# Patient Record
Sex: FEMALE | Race: WHITE | Employment: UNEMPLOYED | URBAN - NONMETROPOLITAN AREA
[De-identification: names, ages, dates, MRNs, and addresses within clinical notes are randomized per-mention and may not be internally consistent; named-entity substitution may affect disease eponyms.]

---
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
## 2019-01-23 ENCOUNTER — HOSPITAL ENCOUNTER (EMERGENCY)
Age: 19
Discharge: HOME | End: 2019-01-24
Attending: EMERGENCY MEDICINE
Payer: MEDICARE

## 2019-01-23 DIAGNOSIS — O26.899: Primary | ICD-10-CM

## 2019-01-23 DIAGNOSIS — R10.2: Primary | ICD-10-CM

## 2019-01-23 DIAGNOSIS — N89.8 VAGINAL DISCHARGE: ICD-10-CM

## 2019-01-23 PROCEDURE — 87070 CULTURE OTHR SPECIMN AEROBIC: CPT

## 2019-01-23 PROCEDURE — 87210 SMEAR WET MOUNT SALINE/INK: CPT

## 2019-01-23 PROCEDURE — 99283 EMERGENCY DEPT VISIT LOW MDM: CPT

## 2019-01-23 PROCEDURE — 2709999900 HC NON-CHARGEABLE SUPPLY

## 2019-01-23 PROCEDURE — 87491 CHLMYD TRACH DNA AMP PROBE: CPT

## 2019-01-23 PROCEDURE — 87220 TISSUE EXAM FOR FUNGI: CPT

## 2019-01-23 PROCEDURE — 87205 SMEAR GRAM STAIN: CPT

## 2019-01-23 PROCEDURE — 87591 N.GONORRHOEAE DNA AMP PROB: CPT

## 2019-01-23 ASSESSMENT — ENCOUNTER SYMPTOMS
SORE THROAT: 0
SHORTNESS OF BREATH: 0
BACK PAIN: 0
ABDOMINAL PAIN: 0
BLOOD IN STOOL: 0
DIARRHEA: 0
WHEEZING: 0
COUGH: 0
NAUSEA: 0
VOMITING: 0

## 2019-01-24 VITALS
SYSTOLIC BLOOD PRESSURE: 118 MMHG | DIASTOLIC BLOOD PRESSURE: 72 MMHG | RESPIRATION RATE: 16 BRPM | BODY MASS INDEX: 28.41 KG/M2 | HEART RATE: 88 BPM | TEMPERATURE: 98 F | WEIGHT: 176 LBS | OXYGEN SATURATION: 98 %

## 2019-01-24 LAB
ANION GAP SERPL CALCULATED.3IONS-SCNC: 13 MEQ/L (ref 8–16)
BACTERIA: ABNORMAL /HPF
BASOPHILS # BLD: 0.6 %
BASOPHILS ABSOLUTE: 0 THOU/MM3 (ref 0–0.1)
BILIRUBIN URINE: NEGATIVE
BLOOD, URINE: ABNORMAL
BUN BLDV-MCNC: 9 MG/DL (ref 7–22)
CALCIUM SERPL-MCNC: 9.2 MG/DL (ref 8.5–10.5)
CASTS 2: ABNORMAL /LPF
CASTS UA: ABNORMAL /LPF
CHARACTER, URINE: ABNORMAL
CHLAMYDIA TRACHOMATIS BY RT-PCR: NOT DETECTED
CHLORIDE BLD-SCNC: 105 MEQ/L (ref 98–111)
CO2: 24 MEQ/L (ref 23–33)
COLOR: YELLOW
CREAT SERPL-MCNC: 0.8 MG/DL (ref 0.4–1.2)
CRYSTALS, UA: ABNORMAL
CT/NG SOURCE: NORMAL
EOSINOPHIL # BLD: 1.9 %
EOSINOPHILS ABSOLUTE: 0.1 THOU/MM3 (ref 0–0.4)
EPITHELIAL CELLS, UA: ABNORMAL /HPF
ERYTHROCYTE [DISTWIDTH] IN BLOOD BY AUTOMATED COUNT: 13.2 % (ref 11.5–14.5)
ERYTHROCYTE [DISTWIDTH] IN BLOOD BY AUTOMATED COUNT: 45.3 FL (ref 35–45)
GLUCOSE BLD-MCNC: 123 MG/DL (ref 70–108)
GLUCOSE URINE: NEGATIVE MG/DL
HCG,BETA SUBUNIT,QUAL,SERUM: 327.7 MIU/ML (ref 0–5)
HCT VFR BLD CALC: 37.2 % (ref 37–47)
HEMOGLOBIN: 11.4 GM/DL (ref 12–16)
IMMATURE GRANS (ABS): 0.02 THOU/MM3 (ref 0–0.07)
IMMATURE GRANULOCYTES: 0.3 %
KETONES, URINE: NEGATIVE
KOH PREP: NORMAL
LEUKOCYTE ESTERASE, URINE: NEGATIVE
LYMPHOCYTES # BLD: 41.2 %
LYMPHOCYTES ABSOLUTE: 2.6 THOU/MM3 (ref 1–4.8)
MCH RBC QN AUTO: 28.8 PG (ref 26–33)
MCHC RBC AUTO-ENTMCNC: 30.6 GM/DL (ref 32.2–35.5)
MCV RBC AUTO: 93.9 FL (ref 81–99)
MISCELLANEOUS 2: ABNORMAL
MONOCYTES # BLD: 7.8 %
MONOCYTES ABSOLUTE: 0.5 THOU/MM3 (ref 0.4–1.3)
NEISSERIA GONORRHOEAE BY RT-PCR: NOT DETECTED
NITRITE, URINE: NEGATIVE
NUCLEATED RED BLOOD CELLS: 0 /100 WBC
OSMOLALITY CALCULATION: 283.2 MOSMOL/KG (ref 275–300)
PH UA: 7.5
PLATELET # BLD: 242 THOU/MM3 (ref 130–400)
PMV BLD AUTO: 10 FL (ref 9.4–12.4)
POTASSIUM SERPL-SCNC: 4.3 MEQ/L (ref 3.5–5.2)
PROTEIN UA: NEGATIVE
RBC # BLD: 3.96 MILL/MM3 (ref 4.2–5.4)
RBC URINE: ABNORMAL /HPF
RENAL EPITHELIAL, UA: ABNORMAL
SEG NEUTROPHILS: 48.2 %
SEGMENTED NEUTROPHILS ABSOLUTE COUNT: 3.1 THOU/MM3 (ref 1.8–7.7)
SODIUM BLD-SCNC: 142 MEQ/L (ref 135–145)
SPECIFIC GRAVITY, URINE: 1.01 (ref 1–1.03)
TRICHOMONAS PREP: NORMAL
UROBILINOGEN, URINE: 0.2 EU/DL
WBC # BLD: 6.4 THOU/MM3 (ref 4.8–10.8)
WBC UA: ABNORMAL /HPF
YEAST: ABNORMAL

## 2019-01-24 PROCEDURE — 36415 COLL VENOUS BLD VENIPUNCTURE: CPT

## 2019-01-24 PROCEDURE — 80048 BASIC METABOLIC PNL TOTAL CA: CPT

## 2019-01-24 PROCEDURE — 85025 COMPLETE CBC W/AUTO DIFF WBC: CPT

## 2019-01-24 PROCEDURE — 84702 CHORIONIC GONADOTROPIN TEST: CPT

## 2019-01-24 PROCEDURE — 81001 URINALYSIS AUTO W/SCOPE: CPT

## 2019-01-27 LAB
GENITAL CULTURE, ROUTINE: NORMAL
GRAM STAIN RESULT: NORMAL

## 2019-02-22 ENCOUNTER — HOSPITAL ENCOUNTER (OUTPATIENT)
Dept: WOMENS IMAGING | Age: 19
Discharge: HOME | End: 2019-02-22
Payer: MEDICARE

## 2019-02-22 DIAGNOSIS — N63.0 BREAST MASS: ICD-10-CM

## 2019-02-22 PROCEDURE — 76642 ULTRASOUND BREAST LIMITED: CPT

## 2019-03-14 ENCOUNTER — HOSPITAL ENCOUNTER (OUTPATIENT)
Dept: WOMENS IMAGING | Age: 19
Discharge: HOME | End: 2019-03-14
Payer: MEDICARE

## 2019-03-14 DIAGNOSIS — N63.20 LEFT BREAST MASS: ICD-10-CM

## 2019-03-14 PROCEDURE — 19083 BX BREAST 1ST LESION US IMAG: CPT

## 2019-03-14 PROCEDURE — 2709999900 HC NON-CHARGEABLE SUPPLY

## 2019-03-14 PROCEDURE — A4648 IMPLANTABLE TISSUE MARKER: HCPCS

## 2019-03-14 PROCEDURE — 88305 TISSUE EXAM BY PATHOLOGIST: CPT

## 2019-03-14 PROCEDURE — C1894 INTRO/SHEATH, NON-LASER: HCPCS

## 2019-03-18 ENCOUNTER — TELEPHONE (OUTPATIENT)
Dept: FAMILY MEDICINE CLINIC | Age: 19
End: 2019-03-18

## 2019-03-18 DIAGNOSIS — F33.1 MAJOR DEPRESSIVE DISORDER, RECURRENT EPISODE, MODERATE (HCC): ICD-10-CM

## 2019-03-18 RX ORDER — FLUOXETINE HYDROCHLORIDE 20 MG/1
20 CAPSULE ORAL DAILY
Qty: 30 CAPSULE | Refills: 5 | Status: SHIPPED | OUTPATIENT
Start: 2019-03-18 | End: 2020-01-27 | Stop reason: SDUPTHER

## 2019-10-07 ENCOUNTER — HOSPITAL ENCOUNTER (OUTPATIENT)
Dept: WOMENS IMAGING | Age: 19
Discharge: HOME | End: 2019-10-07
Payer: MEDICARE

## 2019-10-07 DIAGNOSIS — N63.20 LEFT BREAST MASS: ICD-10-CM

## 2019-10-07 DIAGNOSIS — Z09 FOLLOW-UP EXAM, 3-6 MONTHS SINCE PREVIOUS EXAM: ICD-10-CM

## 2019-10-07 PROCEDURE — 76642 ULTRASOUND BREAST LIMITED: CPT

## 2020-01-27 ENCOUNTER — HOSPITAL ENCOUNTER (OUTPATIENT)
Dept: GENERAL RADIOLOGY | Age: 20
Discharge: HOME | End: 2020-01-27
Payer: MEDICARE

## 2020-01-27 ENCOUNTER — OFFICE VISIT (OUTPATIENT)
Dept: FAMILY MEDICINE CLINIC | Age: 20
End: 2020-01-27
Payer: MEDICARE

## 2020-01-27 ENCOUNTER — HOSPITAL ENCOUNTER (OUTPATIENT)
Age: 20
Discharge: HOME | End: 2020-01-27
Payer: MEDICARE

## 2020-01-27 ENCOUNTER — TELEPHONE (OUTPATIENT)
Dept: FAMILY MEDICINE CLINIC | Age: 20
End: 2020-01-27

## 2020-01-27 VITALS
HEART RATE: 91 BPM | OXYGEN SATURATION: 98 % | TEMPERATURE: 98.3 F | DIASTOLIC BLOOD PRESSURE: 76 MMHG | SYSTOLIC BLOOD PRESSURE: 125 MMHG | RESPIRATION RATE: 12 BRPM | WEIGHT: 198.8 LBS | HEIGHT: 66 IN | BODY MASS INDEX: 31.95 KG/M2

## 2020-01-27 PROCEDURE — G8417 CALC BMI ABV UP PARAM F/U: HCPCS | Performed by: FAMILY MEDICINE

## 2020-01-27 PROCEDURE — G8427 DOCREV CUR MEDS BY ELIG CLIN: HCPCS | Performed by: FAMILY MEDICINE

## 2020-01-27 PROCEDURE — 73590 X-RAY EXAM OF LOWER LEG: CPT

## 2020-01-27 PROCEDURE — 1036F TOBACCO NON-USER: CPT | Performed by: FAMILY MEDICINE

## 2020-01-27 PROCEDURE — 99214 OFFICE O/P EST MOD 30 MIN: CPT | Performed by: FAMILY MEDICINE

## 2020-01-27 PROCEDURE — G8484 FLU IMMUNIZE NO ADMIN: HCPCS | Performed by: FAMILY MEDICINE

## 2020-01-27 RX ORDER — FLUOXETINE HYDROCHLORIDE 40 MG/1
40 CAPSULE ORAL DAILY
Qty: 30 CAPSULE | Refills: 5 | Status: SHIPPED | OUTPATIENT
Start: 2020-01-27 | End: 2020-07-28 | Stop reason: SDUPTHER

## 2020-01-27 RX ORDER — FLUTICASONE PROPIONATE 50 MCG
2 SPRAY, SUSPENSION (ML) NASAL DAILY
Qty: 1 BOTTLE | Refills: 5 | Status: ON HOLD | OUTPATIENT
Start: 2020-01-27 | End: 2021-05-19 | Stop reason: ALTCHOICE

## 2020-01-27 ASSESSMENT — PATIENT HEALTH QUESTIONNAIRE - PHQ9
2. FEELING DOWN, DEPRESSED OR HOPELESS: 1
1. LITTLE INTEREST OR PLEASURE IN DOING THINGS: 1
SUM OF ALL RESPONSES TO PHQ QUESTIONS 1-9: 2
SUM OF ALL RESPONSES TO PHQ9 QUESTIONS 1 & 2: 2
SUM OF ALL RESPONSES TO PHQ QUESTIONS 1-9: 2

## 2020-01-27 NOTE — PROGRESS NOTES
SUBJECTIVE:  Merlinda Earthly is a 20 y.o. y/o female that presents with Otalgia (right ear pain started 6 months ago; states her left lymph node been painful- when swallows it goes to left side); Pain (right knee pain- fell in july-  pain is constant- any pressure on it, painful); Medication Refill (med pending); and Immunizations (no flu shot today)      States that her mood is 'ok', but notes decreased motivation and does not want to do anything. Sleep has been pretty good. Does note general feelings of guilt. Appetite is 'not good'. Energy is 'bad'. No SI.    HPI:  Symptoms have been present for 6 month(s). The pain is intermittent. The patient describes the pain as sharp / stabbing. Inciting injury or history of trauma? Yes - was stepping off a boat and missed the step and directly hit her proximal tibia, was bruised and swelled initially  Pain is relieved by - unknown  Pain is aggravated by - weight bearing  Radiation of the pain? Yes  Paresthesias of the extremities? No  Decreased ROM? No  Edema? No  Difficulty bearing weight? Yes  Worse with stairs? No    Right Ear Complaint    HPI:  Symptoms have been present for 6 month(s). Inciting incident or history of trauma? no  Decreased hearing? No  Ear tenderness? Yes - right  Ear drainage? No  Feeling of fullness? No, describes it has a sharp pain  Radiation of the pain? No  Dizziness or pre-syncope? No  Affected by position or head movment? No  Sore throat, rhinorrhea or sinus congestion? Yes - recent nasal congestion  Hx of Bruxism?  No        OBJECTIVE:  /76   Pulse 91   Temp 98.3 °F (36.8 °C) (Oral)   Resp 12   Ht 5' 6\" (1.676 m)   Wt 198 lb 12.8 oz (90.2 kg)   LMP 01/27/2020 (Exact Date)   SpO2 98%   BMI 32.09 kg/m²   Physical Examination: General appearance - alert, well appearing, and in no distress  Ears - bilateral TM's and external ear canals normal.  Chest - clear to auscultation, no wheezes, rales or rhonchi, symmetric

## 2020-01-27 NOTE — TELEPHONE ENCOUNTER
Pt notified. States she wants the MRI done at [facility] but not on a Friday or wed.  Prefers later mornings like 10am or 11am. Order up front in tray to get PA through [facility]

## 2020-01-27 NOTE — TELEPHONE ENCOUNTER
----- Message from Roland Best DO sent at 1/27/2020  3:02 PM EST -----  The Xray was normal.  I will order an MRI as we discussed at the visit today.

## 2020-07-27 RX ORDER — FLUOXETINE HYDROCHLORIDE 40 MG/1
40 CAPSULE ORAL DAILY
Qty: 30 CAPSULE | Refills: 5 | OUTPATIENT
Start: 2020-07-27

## 2020-07-28 RX ORDER — FLUOXETINE HYDROCHLORIDE 40 MG/1
40 CAPSULE ORAL DAILY
Qty: 30 CAPSULE | Refills: 5 | Status: SHIPPED | OUTPATIENT
Start: 2020-07-28 | End: 2021-07-02 | Stop reason: SDUPTHER

## 2020-07-28 NOTE — TELEPHONE ENCOUNTER
Paul Sams called requesting a refill on the following medications:  Requested Prescriptions     Pending Prescriptions Disp Refills    FLUoxetine (PROZAC) 40 MG capsule 30 capsule 5     Sig: Take 1 capsule by mouth daily     Pharmacy verified: [facility]  . pv    Pt is out of medication    Date of last visit: 1/27/2020  Date of next visit (if applicable): Visit date not found

## 2020-12-05 ENCOUNTER — TELEPHONE (OUTPATIENT)
Dept: FAMILY MEDICINE CLINIC | Age: 20
End: 2020-12-05

## 2020-12-05 ENCOUNTER — HOSPITAL ENCOUNTER (OUTPATIENT)
Age: 20
Discharge: HOME | End: 2020-12-05
Payer: MEDICARE

## 2020-12-05 DIAGNOSIS — J02.9 PHARYNGITIS, UNSPECIFIED ETIOLOGY: ICD-10-CM

## 2020-12-05 DIAGNOSIS — R52 BODY ACHES: ICD-10-CM

## 2020-12-05 DIAGNOSIS — R51.9 ACUTE NONINTRACTABLE HEADACHE, UNSPECIFIED HEADACHE TYPE: ICD-10-CM

## 2020-12-05 PROCEDURE — U0003 INFECTIOUS AGENT DETECTION BY NUCLEIC ACID (DNA OR RNA); SEVERE ACUTE RESPIRATORY SYNDROME CORONAVIRUS 2 (SARS-COV-2) (CORONAVIRUS DISEASE [COVID-19]), AMPLIFIED PROBE TECHNIQUE, MAKING USE OF HIGH THROUGHPUT TECHNOLOGIES AS DESCRIBED BY CMS-2020-01-R: HCPCS

## 2020-12-05 PROCEDURE — 99211 OFF/OP EST MAY X REQ PHY/QHP: CPT

## 2020-12-05 NOTE — PROGRESS NOTES
Patient walked to room 1, COVID swab collected and sent to the lab, PPE worn, collected by Shirley Peter RN, patient tolerated well and left in stable condition.

## 2020-12-05 NOTE — TELEPHONE ENCOUNTER
Pt phoned on call service stating she has been having a headache with body aches and a sore throat for the last 2 days. Is pregnant, would like tested for covid. COVID order placed. I did advise pt to self quarantine until results are back. Also advised patient she could take tylenol for aches and vitamin C.  And vitamin D.

## 2020-12-07 LAB
PERFORMING LAB: NORMAL
REPORT: NORMAL
SARS-COV-2: NOT DETECTED

## 2020-12-08 ENCOUNTER — TELEPHONE (OUTPATIENT)
Dept: FAMILY MEDICINE CLINIC | Age: 20
End: 2020-12-08

## 2021-05-19 ENCOUNTER — HOSPITAL ENCOUNTER (OUTPATIENT)
Age: 21
Discharge: HOME | End: 2021-05-19
Attending: OBSTETRICS & GYNECOLOGY | Admitting: OBSTETRICS & GYNECOLOGY
Payer: MEDICARE

## 2021-05-19 VITALS
OXYGEN SATURATION: 99 % | BODY MASS INDEX: 31.71 KG/M2 | TEMPERATURE: 97.2 F | HEART RATE: 90 BPM | SYSTOLIC BLOOD PRESSURE: 126 MMHG | DIASTOLIC BLOOD PRESSURE: 61 MMHG | HEIGHT: 67 IN | WEIGHT: 202 LBS | RESPIRATION RATE: 18 BRPM

## 2021-05-19 LAB — AMNISURE PATIENT RESULT: NORMAL

## 2021-05-19 PROCEDURE — 84112 EVAL AMNIOTIC FLUID PROTEIN: CPT

## 2021-05-19 NOTE — PLAN OF CARE
Problem: Healthcare acquired conditions:  Goal: Absence of healthcare acquired conditions  Description: Absence of healthcare acquired conditions  Outcome: Ongoing  Note: Pt remains afebrile, FHT remain reassuring. Problem: Falls - Risk of:  Goal: Will remain free from falls  Description: Will remain free from falls  Outcome: Ongoing  Note: Pt. Remains free from falls at this time. RN will continue to provide for a safe environment. Problem: Discharge Planning:  Goal: Discharged to appropriate level of care  Description: Discharged to appropriate level of care  Outcome: Ongoing  Note: To be discharged home. Care plan reviewed with patient. Patient verbalize understanding of the plan of care and contribute to goal setting.

## 2021-05-19 NOTE — FLOWSHEET NOTE
34w1d patient of Dr. Amarjit Fontana presents to L&D for c/o abd cramping for last few hours and loss of mucus plug around 1430 this afternoon. Patient states she has not felt any cramping since arriving on unit. Pt notes positive fetal movement. States she has had some watery discharge throughout the day. Denies pain currently.

## 2021-05-20 ENCOUNTER — TELEPHONE (OUTPATIENT)
Dept: FAMILY MEDICINE CLINIC | Age: 21
End: 2021-05-20

## 2021-05-20 NOTE — TELEPHONE ENCOUNTER
[address] Transitions Initial Follow Up Call    Outreach made within 2 business days of discharge: Yes    Patient: Kash Gonzales Patient : 2000   MRN: [id number]  Reason for Admission: There are no discharge diagnoses documented for the most recent discharge. Discharge Date: 21       Spoke with: . No answer/No VM set-up    Discharge department/facility: [facility]    TCM Interactive Patient Contact:      Scheduled appointment with PCP within 7-14 days    Follow Up  No future appointments.     Krystal Tan LPN

## 2021-06-28 ENCOUNTER — HOSPITAL ENCOUNTER (INPATIENT)
Age: 21
LOS: 3 days | Discharge: HOME | DRG: 560 | End: 2021-07-01
Attending: OBSTETRICS & GYNECOLOGY | Admitting: OBSTETRICS & GYNECOLOGY
Payer: MEDICARE

## 2021-06-28 LAB
ABO: NORMAL
ALBUMIN SERPL-MCNC: 3.5 G/DL (ref 3.5–5.1)
ALP BLD-CCNC: 134 U/L (ref 38–126)
ALT SERPL-CCNC: 25 U/L (ref 11–66)
AMPHETAMINE+METHAMPHETAMINE URINE SCREEN: NEGATIVE
ANION GAP SERPL CALCULATED.3IONS-SCNC: 8 MEQ/L (ref 8–16)
ANTIBODY SCREEN: NORMAL
AST SERPL-CCNC: 33 U/L (ref 5–40)
BARBITURATE QUANTITATIVE URINE: NEGATIVE
BENZODIAZEPINE QUANTITATIVE URINE: NEGATIVE
BILIRUB SERPL-MCNC: < 0.2 MG/DL (ref 0.3–1.2)
BUN BLDV-MCNC: 11 MG/DL (ref 7–22)
CALCIUM SERPL-MCNC: 9.8 MG/DL (ref 8.5–10.5)
CANNABINOID QUANTITATIVE URINE: NEGATIVE
CHLORIDE BLD-SCNC: 101 MEQ/L (ref 98–111)
CO2: 24 MEQ/L (ref 23–33)
COCAINE METABOLITE QUANTITATIVE URINE: NEGATIVE
CREAT SERPL-MCNC: 0.8 MG/DL (ref 0.4–1.2)
CREATININE URINE: 149.7 MG/DL
ERYTHROCYTE [DISTWIDTH] IN BLOOD BY AUTOMATED COUNT: 13.6 % (ref 11.5–14.5)
ERYTHROCYTE [DISTWIDTH] IN BLOOD BY AUTOMATED COUNT: 41.7 FL (ref 35–45)
GFR SERPL CREATININE-BSD FRML MDRD: 89 ML/MIN/1.73M2
GLUCOSE BLD-MCNC: 103 MG/DL (ref 70–108)
HCT VFR BLD CALC: 30.6 % (ref 37–47)
HEMOGLOBIN: 9.2 GM/DL (ref 12–16)
MCH RBC QN AUTO: 25.3 PG (ref 26–33)
MCHC RBC AUTO-ENTMCNC: 30.1 GM/DL (ref 32.2–35.5)
MCV RBC AUTO: 84.1 FL (ref 81–99)
OPIATES, URINE: NEGATIVE
OXYCODONE: NEGATIVE
PHENCYCLIDINE QUANTITATIVE URINE: NEGATIVE
PLATELET # BLD: 195 THOU/MM3 (ref 130–400)
PMV BLD AUTO: 9.8 FL (ref 9.4–12.4)
POTASSIUM SERPL-SCNC: 3.6 MEQ/L (ref 3.5–5.2)
PROT/CREAT RATIO, UR: 0.15
PROTEIN, URINE: 22.4 MG/DL
RBC # BLD: 3.64 MILL/MM3 (ref 4.2–5.4)
RH FACTOR: NORMAL
SODIUM BLD-SCNC: 133 MEQ/L (ref 135–145)
TOTAL PROTEIN: 6 G/DL (ref 6.1–8)
URIC ACID: 5.5 MG/DL (ref 2.4–5.7)
WBC # BLD: 8.1 THOU/MM3 (ref 4.8–10.8)

## 2021-06-28 PROCEDURE — 86900 BLOOD TYPING SEROLOGIC ABO: CPT

## 2021-06-28 PROCEDURE — 80307 DRUG TEST PRSMV CHEM ANLYZR: CPT

## 2021-06-28 PROCEDURE — 86592 SYPHILIS TEST NON-TREP QUAL: CPT

## 2021-06-28 PROCEDURE — 84550 ASSAY OF BLOOD/URIC ACID: CPT

## 2021-06-28 PROCEDURE — 82570 ASSAY OF URINE CREATININE: CPT

## 2021-06-28 PROCEDURE — 86850 RBC ANTIBODY SCREEN: CPT

## 2021-06-28 PROCEDURE — 86901 BLOOD TYPING SEROLOGIC RH(D): CPT

## 2021-06-28 PROCEDURE — 85027 COMPLETE CBC AUTOMATED: CPT

## 2021-06-28 PROCEDURE — 80053 COMPREHEN METABOLIC PANEL: CPT

## 2021-06-28 PROCEDURE — 6360000002 HC RX W HCPCS

## 2021-06-28 PROCEDURE — 84156 ASSAY OF PROTEIN URINE: CPT

## 2021-06-28 PROCEDURE — 2580000003 HC RX 258: Performed by: OBSTETRICS & GYNECOLOGY

## 2021-06-28 PROCEDURE — 36415 COLL VENOUS BLD VENIPUNCTURE: CPT

## 2021-06-28 RX ORDER — MISOPROSTOL 200 UG/1
1000 TABLET ORAL PRN
Status: DISCONTINUED | OUTPATIENT
Start: 2021-06-28 | End: 2021-06-29 | Stop reason: HOSPADM

## 2021-06-28 RX ORDER — TERBUTALINE SULFATE 1 MG/ML
0.25 INJECTION, SOLUTION SUBCUTANEOUS
Status: ACTIVE | OUTPATIENT
Start: 2021-06-28 | End: 2021-06-28

## 2021-06-28 RX ORDER — SODIUM CHLORIDE, SODIUM LACTATE, POTASSIUM CHLORIDE, AND CALCIUM CHLORIDE .6; .31; .03; .02 G/100ML; G/100ML; G/100ML; G/100ML
500 INJECTION, SOLUTION INTRAVENOUS PRN
Status: DISCONTINUED | OUTPATIENT
Start: 2021-06-28 | End: 2021-06-29 | Stop reason: HOSPADM

## 2021-06-28 RX ORDER — BUTORPHANOL TARTRATE 1 MG/ML
1 INJECTION, SOLUTION INTRAMUSCULAR; INTRAVENOUS
Status: COMPLETED | OUTPATIENT
Start: 2021-06-28 | End: 2021-06-29

## 2021-06-28 RX ORDER — SEVOFLURANE 250 ML/250ML
1 LIQUID RESPIRATORY (INHALATION) CONTINUOUS PRN
Status: DISCONTINUED | OUTPATIENT
Start: 2021-06-28 | End: 2021-06-29 | Stop reason: HOSPADM

## 2021-06-28 RX ORDER — SODIUM CHLORIDE, SODIUM LACTATE, POTASSIUM CHLORIDE, CALCIUM CHLORIDE 600; 310; 30; 20 MG/100ML; MG/100ML; MG/100ML; MG/100ML
INJECTION, SOLUTION INTRAVENOUS CONTINUOUS
Status: DISCONTINUED | OUTPATIENT
Start: 2021-06-28 | End: 2021-06-29

## 2021-06-28 RX ORDER — ONDANSETRON 2 MG/ML
8 INJECTION INTRAMUSCULAR; INTRAVENOUS EVERY 6 HOURS PRN
Status: DISCONTINUED | OUTPATIENT
Start: 2021-06-28 | End: 2021-06-29 | Stop reason: HOSPADM

## 2021-06-28 RX ORDER — SODIUM CHLORIDE, SODIUM LACTATE, POTASSIUM CHLORIDE, AND CALCIUM CHLORIDE .6; .31; .03; .02 G/100ML; G/100ML; G/100ML; G/100ML
1000 INJECTION, SOLUTION INTRAVENOUS PRN
Status: DISCONTINUED | OUTPATIENT
Start: 2021-06-28 | End: 2021-06-29 | Stop reason: HOSPADM

## 2021-06-28 RX ORDER — CARBOPROST TROMETHAMINE 250 UG/ML
250 INJECTION, SOLUTION INTRAMUSCULAR PRN
Status: DISCONTINUED | OUTPATIENT
Start: 2021-06-28 | End: 2021-06-29 | Stop reason: HOSPADM

## 2021-06-28 RX ORDER — LIDOCAINE HYDROCHLORIDE 10 MG/ML
30 INJECTION, SOLUTION EPIDURAL; INFILTRATION; INTRACAUDAL; PERINEURAL PRN
Status: DISCONTINUED | OUTPATIENT
Start: 2021-06-28 | End: 2021-06-29 | Stop reason: HOSPADM

## 2021-06-28 RX ORDER — ACETAMINOPHEN 325 MG/1
650 TABLET ORAL EVERY 4 HOURS PRN
Status: DISCONTINUED | OUTPATIENT
Start: 2021-06-28 | End: 2021-06-29 | Stop reason: HOSPADM

## 2021-06-28 RX ORDER — DIPHENHYDRAMINE HYDROCHLORIDE 50 MG/ML
25 INJECTION INTRAMUSCULAR; INTRAVENOUS EVERY 4 HOURS PRN
Status: DISCONTINUED | OUTPATIENT
Start: 2021-06-28 | End: 2021-06-29 | Stop reason: HOSPADM

## 2021-06-28 RX ORDER — MORPHINE SULFATE 4 MG/ML
4 INJECTION, SOLUTION INTRAMUSCULAR; INTRAVENOUS
Status: DISCONTINUED | OUTPATIENT
Start: 2021-06-28 | End: 2021-06-29 | Stop reason: HOSPADM

## 2021-06-28 RX ORDER — METHYLERGONOVINE MALEATE 0.2 MG/ML
200 INJECTION INTRAVENOUS PRN
Status: DISCONTINUED | OUTPATIENT
Start: 2021-06-28 | End: 2021-06-29 | Stop reason: HOSPADM

## 2021-06-28 RX ORDER — MORPHINE SULFATE 2 MG/ML
2 INJECTION, SOLUTION INTRAMUSCULAR; INTRAVENOUS
Status: DISCONTINUED | OUTPATIENT
Start: 2021-06-28 | End: 2021-06-29 | Stop reason: HOSPADM

## 2021-06-28 RX ORDER — ACETAMINOPHEN 500 MG
1000 TABLET ORAL PRN
COMMUNITY
End: 2022-08-23

## 2021-06-28 RX ORDER — IBUPROFEN 800 MG/1
800 TABLET ORAL EVERY 8 HOURS PRN
Status: DISCONTINUED | OUTPATIENT
Start: 2021-06-28 | End: 2021-06-29 | Stop reason: HOSPADM

## 2021-06-28 RX ADMIN — SODIUM CHLORIDE, POTASSIUM CHLORIDE, SODIUM LACTATE AND CALCIUM CHLORIDE: 600; 310; 30; 20 INJECTION, SOLUTION INTRAVENOUS at 21:53

## 2021-06-28 ASSESSMENT — PAIN DESCRIPTION - DESCRIPTORS: DESCRIPTORS: ACHING

## 2021-06-28 NOTE — FLOWSHEET NOTE
In room  5co6 in bathroom. Weighed and then to bed. States 2nd pregnancy and had one miscarriage. States due date is 06- and Ty Lenz at side.

## 2021-06-28 NOTE — FLOWSHEET NOTE
Barbiescooby Baez given report at the labor and delivery desk on [facility] of  2 para 0 per patient with one miscarriage and of 39 6/7 weeks gestation her due to having headache and shortness of breath today and just does not feel well. Says that yesterday had nausea  and has\" lightening croch\" and back ache but not presently. Says was examined by Dr. Georges Ortega on 2021 and was 1 cm and has lost some mucous. In alex[place]ru Cif[place]es of admission vital signs and of the fetal tracing not reactive but reassuring at this time. Informed still under admission process.

## 2021-06-28 NOTE — PLAN OF CARE
Problem: Pain:  Goal: Pain level will decrease  Description: Pain level will decrease  Outcome: Ongoing  Note: Headache was a 2 on kane scale for admission and then pain goal is 0 and headache is no pain at this time. Problem: Healthcare acquired conditions:  Goal: Absence of healthcare acquired conditions  Description: Absence of healthcare acquired conditions  Outcome: Ongoing  Note: Afebrile and will continue to monitor. Problem: Discharge Planning:  Goal: Discharged to appropriate level of care  Description: Discharged to appropriate level of care  Outcome: Ongoing  Note: Plans to be discharged to own home when the doctor discharges either prior to delivery or after delivery. Care plan reviewed with patient and Jeremy Dakins and they verbalize understanding of the plan of care and contribute to goal setting.

## 2021-06-29 ENCOUNTER — ANESTHESIA EVENT (OUTPATIENT)
Dept: LABOR AND DELIVERY | Age: 21
DRG: 560 | End: 2021-06-29
Payer: MEDICARE

## 2021-06-29 ENCOUNTER — ANESTHESIA (OUTPATIENT)
Dept: LABOR AND DELIVERY | Age: 21
DRG: 560 | End: 2021-06-29
Payer: MEDICARE

## 2021-06-29 LAB — RPR: NONREACTIVE

## 2021-06-29 PROCEDURE — 6370000000 HC RX 637 (ALT 250 FOR IP): Performed by: OBSTETRICS & GYNECOLOGY

## 2021-06-29 PROCEDURE — 2580000003 HC RX 258: Performed by: OBSTETRICS & GYNECOLOGY

## 2021-06-29 PROCEDURE — 2500000003 HC RX 250 WO HCPCS: Performed by: OBSTETRICS & GYNECOLOGY

## 2021-06-29 PROCEDURE — 7200000001 HC VAGINAL DELIVERY

## 2021-06-29 PROCEDURE — 0KQM0ZZ REPAIR PERINEUM MUSCLE, OPEN APPROACH: ICD-10-PCS | Performed by: OBSTETRICS & GYNECOLOGY

## 2021-06-29 PROCEDURE — 6360000002 HC RX W HCPCS: Performed by: OBSTETRICS & GYNECOLOGY

## 2021-06-29 PROCEDURE — 3E033VJ INTRODUCTION OF OTHER HORMONE INTO PERIPHERAL VEIN, PERCUTANEOUS APPROACH: ICD-10-PCS | Performed by: OBSTETRICS & GYNECOLOGY

## 2021-06-29 PROCEDURE — 2500000003 HC RX 250 WO HCPCS: Performed by: ANESTHESIOLOGY

## 2021-06-29 PROCEDURE — 3700000025 EPIDURAL BLOCK: Performed by: ANESTHESIOLOGY

## 2021-06-29 PROCEDURE — 1200000000 HC SEMI PRIVATE

## 2021-06-29 PROCEDURE — 10907ZC DRAINAGE OF AMNIOTIC FLUID, THERAPEUTIC FROM PRODUCTS OF CONCEPTION, VIA NATURAL OR ARTIFICIAL OPENING: ICD-10-PCS | Performed by: OBSTETRICS & GYNECOLOGY

## 2021-06-29 RX ORDER — ONDANSETRON 2 MG/ML
8 INJECTION INTRAMUSCULAR; INTRAVENOUS EVERY 8 HOURS PRN
Status: DISCONTINUED | OUTPATIENT
Start: 2021-06-29 | End: 2021-07-01 | Stop reason: HOSPADM

## 2021-06-29 RX ORDER — ONDANSETRON 2 MG/ML
4 INJECTION INTRAMUSCULAR; INTRAVENOUS EVERY 6 HOURS PRN
Status: DISCONTINUED | OUTPATIENT
Start: 2021-06-29 | End: 2021-06-29 | Stop reason: HOSPADM

## 2021-06-29 RX ORDER — NALOXONE HYDROCHLORIDE 0.4 MG/ML
0.4 INJECTION, SOLUTION INTRAMUSCULAR; INTRAVENOUS; SUBCUTANEOUS PRN
Status: DISCONTINUED | OUTPATIENT
Start: 2021-06-29 | End: 2021-07-01 | Stop reason: HOSPADM

## 2021-06-29 RX ORDER — MORPHINE SULFATE 2 MG/ML
2 INJECTION, SOLUTION INTRAMUSCULAR; INTRAVENOUS
Status: DISCONTINUED | OUTPATIENT
Start: 2021-06-29 | End: 2021-07-01 | Stop reason: HOSPADM

## 2021-06-29 RX ORDER — NALOXONE HYDROCHLORIDE 0.4 MG/ML
0.4 INJECTION, SOLUTION INTRAMUSCULAR; INTRAVENOUS; SUBCUTANEOUS PRN
Status: DISCONTINUED | OUTPATIENT
Start: 2021-06-29 | End: 2021-06-29 | Stop reason: HOSPADM

## 2021-06-29 RX ORDER — ACETAMINOPHEN 325 MG/1
650 TABLET ORAL EVERY 4 HOURS PRN
Status: DISCONTINUED | OUTPATIENT
Start: 2021-06-29 | End: 2021-07-01 | Stop reason: HOSPADM

## 2021-06-29 RX ORDER — DOCUSATE SODIUM 100 MG/1
100 CAPSULE, LIQUID FILLED ORAL 2 TIMES DAILY
Status: DISCONTINUED | OUTPATIENT
Start: 2021-06-29 | End: 2021-07-01 | Stop reason: HOSPADM

## 2021-06-29 RX ORDER — SODIUM CHLORIDE 0.9 % (FLUSH) 0.9 %
10 SYRINGE (ML) INJECTION PRN
Status: DISCONTINUED | OUTPATIENT
Start: 2021-06-29 | End: 2021-07-01 | Stop reason: HOSPADM

## 2021-06-29 RX ORDER — ZOLPIDEM TARTRATE 10 MG/1
10 TABLET ORAL NIGHTLY PRN
Status: DISCONTINUED | OUTPATIENT
Start: 2021-06-29 | End: 2021-07-01 | Stop reason: HOSPADM

## 2021-06-29 RX ORDER — DIPHENHYDRAMINE HCL 25 MG
25 TABLET ORAL EVERY 6 HOURS PRN
Status: DISCONTINUED | OUTPATIENT
Start: 2021-06-29 | End: 2021-07-01 | Stop reason: HOSPADM

## 2021-06-29 RX ORDER — MORPHINE SULFATE 4 MG/ML
4 INJECTION, SOLUTION INTRAMUSCULAR; INTRAVENOUS
Status: DISCONTINUED | OUTPATIENT
Start: 2021-06-29 | End: 2021-07-01 | Stop reason: HOSPADM

## 2021-06-29 RX ORDER — FERROUS SULFATE 325(65) MG
325 TABLET ORAL
Status: DISCONTINUED | OUTPATIENT
Start: 2021-06-30 | End: 2021-07-01 | Stop reason: HOSPADM

## 2021-06-29 RX ORDER — SODIUM CHLORIDE 9 MG/ML
25 INJECTION, SOLUTION INTRAVENOUS PRN
Status: DISCONTINUED | OUTPATIENT
Start: 2021-06-29 | End: 2021-07-01 | Stop reason: HOSPADM

## 2021-06-29 RX ORDER — MODIFIED LANOLIN
OINTMENT (GRAM) TOPICAL PRN
Status: DISCONTINUED | OUTPATIENT
Start: 2021-06-29 | End: 2021-07-01 | Stop reason: HOSPADM

## 2021-06-29 RX ORDER — NALBUPHINE HCL 10 MG/ML
5 AMPUL (ML) INJECTION EVERY 4 HOURS PRN
Status: DISCONTINUED | OUTPATIENT
Start: 2021-06-29 | End: 2021-06-29

## 2021-06-29 RX ORDER — HYDROCODONE BITARTRATE AND ACETAMINOPHEN 5; 325 MG/1; MG/1
2 TABLET ORAL EVERY 4 HOURS PRN
Status: DISCONTINUED | OUTPATIENT
Start: 2021-06-29 | End: 2021-07-01 | Stop reason: HOSPADM

## 2021-06-29 RX ORDER — IBUPROFEN 800 MG/1
800 TABLET ORAL EVERY 8 HOURS
Status: DISCONTINUED | OUTPATIENT
Start: 2021-06-29 | End: 2021-07-01 | Stop reason: HOSPADM

## 2021-06-29 RX ORDER — SODIUM CHLORIDE 0.9 % (FLUSH) 0.9 %
10 SYRINGE (ML) INJECTION EVERY 12 HOURS SCHEDULED
Status: DISCONTINUED | OUTPATIENT
Start: 2021-06-29 | End: 2021-07-01 | Stop reason: HOSPADM

## 2021-06-29 RX ADMIN — Medication 166.7 ML: at 18:47

## 2021-06-29 RX ADMIN — SODIUM CHLORIDE, POTASSIUM CHLORIDE, SODIUM LACTATE AND CALCIUM CHLORIDE: 600; 310; 30; 20 INJECTION, SOLUTION INTRAVENOUS at 11:33

## 2021-06-29 RX ADMIN — FAMOTIDINE 20 MG: 10 INJECTION, SOLUTION INTRAVENOUS at 00:59

## 2021-06-29 RX ADMIN — Medication 1 MILLI-UNITS/MIN: at 00:40

## 2021-06-29 RX ADMIN — BUTORPHANOL TARTRATE 1 MG: 1 INJECTION, SOLUTION INTRAMUSCULAR; INTRAVENOUS at 09:50

## 2021-06-29 RX ADMIN — Medication 18 ML/HR: at 12:54

## 2021-06-29 RX ADMIN — BUTORPHANOL TARTRATE 1 MG: 1 INJECTION, SOLUTION INTRAMUSCULAR; INTRAVENOUS at 01:46

## 2021-06-29 RX ADMIN — IBUPROFEN 800 MG: 800 TABLET, FILM COATED ORAL at 19:25

## 2021-06-29 RX ADMIN — SODIUM CHLORIDE, POTASSIUM CHLORIDE, SODIUM LACTATE AND CALCIUM CHLORIDE: 600; 310; 30; 20 INJECTION, SOLUTION INTRAVENOUS at 04:45

## 2021-06-29 RX ADMIN — BUTORPHANOL TARTRATE 1 MG: 1 INJECTION, SOLUTION INTRAMUSCULAR; INTRAVENOUS at 11:33

## 2021-06-29 ASSESSMENT — PAIN SCALES - GENERAL
PAINLEVEL_OUTOF10: 6
PAINLEVEL_OUTOF10: 6
PAINLEVEL_OUTOF10: 2

## 2021-06-29 NOTE — FLOWSHEET NOTE
Ku Induction placed by Dr. Jacqui Lizarraga. Balloon filled with 45 cc 0.9 NS. Ku catheter connected to continuous infusion of 0.9 NS at 60 ml/hr. Order received to start pitocin at 1 mu/min and titrate up to 4 mu/min until ku is removed per MD. Traction applied to ku catheter and taped to pt leg. Pt tolerated well.

## 2021-06-29 NOTE — ANESTHESIA PRE PROCEDURE
Department of Anesthesiology  Preprocedure Note       Name:  Idalia Kelley   Age:  24 y.o.  :  2000                                          MRN:  [id number]         Date:  2021      Surgeon: * No surgeons listed *    Procedure: * No procedures listed *    Medications prior to admission:   Prior to Admission medications    Medication Sig Start Date End Date Taking?  Authorizing Provider   acetaminophen (TYLENOL) 500 MG tablet Take 1,000 mg by mouth as needed for Pain (headache)   Yes Historical Provider, MD   Multiple Vitamins-Minerals (ADULT GUMMY PO) Take 2 Doses by mouth daily    Historical Provider, MD   Prenatal MV-Min-Fe Fum-FA-DHA (PRENATAL 1 PO) Take by mouth    Historical Provider, MD   FLUoxetine (PROZAC) 40 MG capsule Take 1 capsule by mouth daily 20   Jamal Restrepo DO       Current medications:    Current Facility-Administered Medications   Medication Dose Route Frequency Provider Last Rate Last Admin    famotidine (PEPCID) injection 20 mg  20 mg Intravenous BID PRN Kai Kilpatrick MD        naloxone [facility]) injection 0.4 mg  0.4 mg Intravenous PRN Evelina Apgar Pasion,         nalbuphine (NUBAIN) injection 5 mg  5 mg Intravenous Q4H PRN Jovanny De Dios, DO        ondansetron (ZOFRAN) injection 4 mg  4 mg Intravenous Q6H PRN Jovanny De Dios,         fentaNYL 750 mcg, ropivacaine 0.1% in sodium chloride 0.9% 265mL (OB) epidural  18 mL/hr Epidural Continuous Jovanny De Dios DO        oxytocin (PITOCIN) 30 units in 500 mL infusion  1 kendra-units/min Intravenous Continuous Kai Kilpatrick MD 8 mL/hr at 21 0407 8 kendra-units/min at 21 0407    lactated ringers infusion   Intravenous Continuous Kai Kilpatrick  mL/hr at 21 1133 New Bag at 21 1133    lactated ringers bolus  500 mL Intravenous PRN Kai Kilpatrick MD        Or    lactated ringers bolus  1,000 mL Intravenous PRN Kai Kilpatrick MD        lidocaine PF 1 % injection 30 mL No      Vital Signs (Current):   Vitals:    06/29/21 0945 06/29/21 1015 06/29/21 1045 06/29/21 1115   BP: (!) 140/88 127/80 129/79 124/67   Pulse: 83 69 68 82   Resp: 18 18 18    Temp:       SpO2:       Weight:                                                  BP Readings from Last 3 Encounters:   06/29/21 124/67   05/19/21 126/61   01/27/20 125/76       NPO Status: Time of last liquid consumption: 1500                        Time of last solid consumption: 1500                        Date of last liquid consumption: 06/28/21                        Date of last solid food consumption: 06/28/21    BMI:   Wt Readings from Last 3 Encounters:   06/28/21 215 lb (97.5 kg)   05/19/21 202 lb (91.6 kg)   01/27/20 198 lb 12.8 oz (90.2 kg)     Body mass index is 33.67 kg/m². CBC:   Lab Results   Component Value Date    WBC 8.1 06/28/2021    RBC 3.64 06/28/2021    RBC 3.82 02/10/2021    HGB 9.2 06/28/2021    HCT 30.6 06/28/2021    MCV 84.1 06/28/2021    RDW 13.4 02/10/2021     06/28/2021       CMP:   Lab Results   Component Value Date     06/28/2021    K 3.6 06/28/2021     06/28/2021    CO2 24 06/28/2021    BUN 11 06/28/2021    CREATININE 0.8 06/28/2021    LABGLOM 89 06/28/2021    GLUCOSE 103 06/28/2021    PROT 6.0 06/28/2021    CALCIUM 9.8 06/28/2021    BILITOT <0.2 06/28/2021    ALKPHOS 134 06/28/2021    AST 33 06/28/2021    ALT 25 06/28/2021       POC Tests: No results for input(s): POCGLU, POCNA, POCK, POCCL, POCBUN, POCHEMO, POCHCT in the last 72 hours.     Coags: No results found for: PROTIME, INR, APTT    HCG (If Applicable):   Lab Results   Component Value Date    PREGTESTUR negative 09/19/2016    PREGSERUM NEGATIVE 08/02/2018        ABGs: No results found for: PHART, PO2ART, YQM6HNR, ZLN8WJO, BEART, K6JZFAKJ     Type & Screen (If Applicable):  Lab Results   Component Value Date    LABABO O 02/10/2021    [address] 06/28/2021       Drug/Infectious Status (If Applicable):  No results found for: HIV, HEPCAB    COVID-19 Screening (If Applicable):   Lab Results   Component Value Date    COVID19 NOT DETECTED 12/05/2020           Anesthesia Evaluation  Patient summary reviewed and Nursing notes reviewed no history of anesthetic complications:   Airway: Mallampati: I        Dental:          Pulmonary: breath sounds clear to auscultation                             Cardiovascular:  Exercise tolerance: good (>4 METS),           Rhythm: regular  Rate: normal                    Neuro/Psych:   (+) headaches:, psychiatric history:            GI/Hepatic/Renal:             Endo/Other:                     Abdominal:       Abdomen: soft. Vascular: Other Findings:             Anesthesia Plan      epidural     ASA 2             Anesthetic plan and risks discussed with patient and spouse.                       [address] [place] [address],    6/29/2021

## 2021-06-29 NOTE — ANESTHESIA PROCEDURE NOTES
Epidural Block    Patient location during procedure: OB  Reason for block: labor epidural  Staffing  Performed: anesthesiologist   Anesthesiologist: Marilyn Mendoza DO  Preanesthetic Checklist  Completed: patient identified, IV checked, site marked, risks and benefits discussed, surgical consent, monitors and equipment checked, pre-op evaluation, timeout performed, anesthesia consent given, oxygen available and patient being monitored  Epidural  Patient position: sitting  Prep: ChloraPrep  Patient monitoring: continuous pulse ox and frequent blood pressure checks  Approach: midline  Location: lumbar (1-5)  Injection technique: AARON saline  Provider prep: sterile gloves  Needle  Needle type: Tuohy   Needle gauge: 18 G  Needle length: 3.5 in  Needle insertion depth: 8 cm  Catheter type: side hole  Catheter size: 19 G  Catheter at skin depth: 12 cm  Test dose: negative  Assessment  Events: blood aspirated  Hemodynamics: stable  Attempts: 2  Additional Notes  1st catheter placement was a venous catheter  2nd AARON without issue

## 2021-06-29 NOTE — FLOWSHEET NOTE
Dr. Jean Cosme returned call to unit. Updated  40w pt's ku came out at [phone]. Pt SVE /-2 posterior and soft. Baby is reactive on the monitor and betty every 2-5 min. Cont.  POC

## 2021-06-29 NOTE — L&D DELIVERY NOTE
Department of Obstetrics and Gynecology   Vaginal Delivery Note at [facility]  Date of Delivery:  2021    Procedure:  Spontaneous vaginal delivery and repair second degree spontaneous laceration    Surgeon:   Ashly Deleon MD    Anesthesia:  epidural anesthesia    Estimated blood loss:  400ml    Cord blood and cord segment collected Yes    Complications:  none    Condition:  infant stable to general nursery and mother stable    Details of Procedure: The patient is a 24 y.o. female at 37w0d   OB History        2    Para   0    Term   0       0    AB   1    Living   0       SAB   1    TAB   0    Ectopic   0    Molar   0    Multiple   0    Live Births   0             who was admitted for induction. She received the following interventions: ARBOW and IV Pitocin induction. The patient progressed well, became complete and started to push. Patient progressed well and the fetal head was delivered over an intact perineum, the rest of the infant was delivered atraumatically, and placed on mother abdomen. Cord was clamped and cut and infant handed off to the waiting nurse for evaluation. The delivery of the placenta was spontaneous. The perineum and vagina were explored and a second degree laceration was repaired in standard fashion with 2-0 chromic. A vaginal sweep was then performed and any clots were evacuated. All sharps were then discarded and the sponge/instrument count was correct. Female Infant, weight pending, Apgars 8 and 9.     Infant Wt:   Information for the patient's :  Ángel Rosenthal [[id number]]             APGARS:     Information for the patient's :  Ángel Rosenthal [[id number]]          Chel Johnson MD, MD

## 2021-06-29 NOTE — PLAN OF CARE
Problem: Pain:  Goal: Pain level will decrease  Description: Pain level will decrease  6/29/2021 0304 by Hubbell Record, RN  Outcome: Ongoing     Problem: Discharge Planning:  Goal: Discharged to appropriate level of care  Description: Discharged to appropriate level of care  6/29/2021 0304 by Mohinder Record, RN  Outcome: Ongoing     Problem: Anxiety:  Goal: Level of anxiety will decrease  Description: Level of anxiety will decrease  Outcome: Ongoing  Note: Pt remains calm about the birthing experience, S/O at bedside, supportive. All questions/concerns addressed by RN. Problem: Breathing Pattern - Ineffective:  Goal: Able to breathe comfortably  Description: Able to breathe comfortably  Outcome: Ongoing  Note: No signs of resp distress noted. Sp02 remains greater than 92% on room air. Respirations equal and unlabored. Problem: Fluid Volume - Imbalance:  Goal: Absence of intrapartum hemorrhage signs and symptoms  Description: Absence of intrapartum hemorrhage signs and symptoms  Outcome: Ongoing  Note: No vaginal bleeding noted, will continue to monitor. Problem: Infection - Intrapartum Infection:  Goal: Will show no infection signs and symptoms  Description: Will show no infection signs and symptoms  Outcome: Ongoing  Note: Vitals stable, pt afebrile, -GBS       Problem: Labor Process - Prolonged:  Goal: Uterine contractions within specified parameters  Description: Uterine contractions within specified parameters  Outcome: Ongoing  Note: Rampart in place to monitor contractions. Pitocin titrated per order. Problem: Tissue Perfusion - Uteroplacental, Altered:  Goal: Absence of abnormal fetal heart rate pattern  Description: Absence of abnormal fetal heart rate pattern  Outcome: Ongoing  Note: Fetal Heart Tones remain reassuring. Continuous EFM in place.         Problem: Urinary Retention:  Goal: Urinary elimination within specified parameters  Description: Urinary elimination within specified parameters  Outcome: Ongoing  Note: Pt has bathroom privileges. Care plan reviewed with patient and Leilani Netters. Patient and Leilani Netters verbalize understanding of the plan of care and contribute to goal setting.

## 2021-06-29 NOTE — PLAN OF CARE
Problem: Pain:  Description: Pain management should include both nonpharmacologic and pharmacologic interventions. Goal: Pain level will decrease  Description: Pain level will decrease  6/29/2021 0824 by Prema Vargas RN  Note: Pt denies pain at this time  6/29/2021 0304 by Emily Rubin RN  Outcome: Ongoing  6/28/2021 1945 by Emily Rubin RN  Outcome: Ongoing  Note: Pt denies pain at this time. Pain goal is a 7/10  6/28/2021 1922 by Yovanny Hernandez RN  Outcome: Ongoing  Note: Headache was a 2 on kane scale for admission and then pain goal is 0 and headache is no pain at this time. Problem: Discharge Planning:  Goal: Discharged to appropriate level of care  Description: Discharged to appropriate level of care  6/29/2021 0824 by Prema Vargas RN  Note: Plans discharge to home after postpartum period  6/29/2021 0304 by Emily Rubin RN  Outcome: Ongoing  6/28/2021 1945 by Emily Rubin RN  Outcome: Ongoing  Note: Pt aware of DC home if MD does not put ku in.     6/28/2021 1922 by Yovanny Hernandez RN  Outcome: Ongoing  Note: Plans to be discharged to own home when the doctor discharges either prior to delivery or after delivery. Problem: Anxiety:  Goal: Level of anxiety will decrease  Description: Level of anxiety will decrease  6/29/2021 0824 by Prema Vargas RN  Note: Pt denies anxiety   6/29/2021 0304 by Emily Rubin RN  Outcome: Ongoing  Note: Pt remains calm about the birthing experience, S/O at bedside, supportive. All questions/concerns addressed by RN. Problem: Breathing Pattern - Ineffective:  Goal: Able to breathe comfortably  Description: Able to breathe comfortably  6/29/2021 0824 by Prema Vargas RN  Note: RR 18 clear and easy  6/29/2021 0304 by Emily Rubin RN  Outcome: Ongoing  Note: No signs of resp distress noted. Sp02 remains greater than 92% on room air. Respirations equal and unlabored.         Problem: Fluid Volume - Imbalance:  Goal: Absence of intrapartum hemorrhage signs and symptoms  Description: Absence of intrapartum hemorrhage signs and symptoms  6/29/2021 0824 by Teresa Hussein RN  Note: No vaginal bleeding at this time  6/29/2021 0304 by Sandy Main RN  Outcome: Ongoing  Note: No vaginal bleeding noted, will continue to monitor. Problem: Infection - Intrapartum Infection:  Goal: Will show no infection signs and symptoms  Description: Will show no infection signs and symptoms  6/29/2021 0824 by Teresa Hussein RN  Note: WBC's wnl and pt afebrile  6/29/2021 0304 by Sandy Main RN  Outcome: Ongoing  Note: Vitals stable, pt afebrile, -GBS       Problem: Labor Process - Prolonged:  Goal: Uterine contractions within specified parameters  Description: Uterine contractions within specified parameters  6/29/2021 0824 by Teresa Hussein RN  Note: Contractions every 2 minutes. Pit running for induction. Plans AROM. 6/29/2021 0304 by Sandy Main RN  Outcome: Ongoing  Note: Holstein in place to monitor contractions. Pitocin titrated per order. Problem: Pain - Acute:  Goal: Able to cope with pain  Description: Able to cope with pain  6/29/2021 0824 by Teresa Hussein RN  Note: Plans epidural for pain relief. Pain goal 6/10.  6/29/2021 0304 by Sandy Main RN  Outcome: Ongoing  Note: Pt states pain goal is a 7/10. Problem: Tissue Perfusion - Uteroplacental, Altered:  Description: [TRUNCATED] For intrapartum patients with recurrent variable decelerations of the fetal heart rate, consider transcervical amnioinfusion. For patients in labor, avoid prophylactic use of continuous maternal oxygen supplementation to prevent nonreassu . .. Goal: Absence of abnormal fetal heart rate pattern  Description: Absence of abnormal fetal heart rate pattern  6/29/2021 0824 by Teresa Hussein RN  Note: FHT's reactive at this time  6/29/2021 0304 by Sandy Main RN  Outcome: Ongoing  Note: Fetal Heart Tones remain reassuring. Continuous EFM in place.         Problem: Urinary Retention:  Goal: Urinary elimination within specified parameters  Description: Urinary elimination within specified parameters  6/29/2021 0824 by Tena Winter RN  Note: Voiding qs  6/29/2021 0304 by Elizabeth Ronquillo RN  Outcome: Ongoing  Note: Pt has bathroom privileges. Care plan reviewed with patient. Patient verbalize understanding of the plan of care and contribute to goal setting.

## 2021-06-29 NOTE — FLOWSHEET NOTE
Dr. Vincenzo Lopes called unit for update. MD states reviewing pt's labs and to be notified once Protein Creatinine results are in. Notified baby is reactive on the monitor and pt is not betty.

## 2021-06-29 NOTE — H&P
[address] [address]  History and Physical Update    Pt Name: Bernice Herrera  MRN: [id number]  YOB: 2000  Date of evaluation: 2021    [] I have examined the patient and reviewed the H&P/Consult and there are no changes to the patient or plans. [x] I have examined the patient and reviewed the H&P/Consult and have noted the following changes:   25 yo  at 40 weeks for IOL. Pt was scheduled for this evening but presented last evening with HA and SOB. Those sx resolved but we offered her to stay for induction and she chose to stay. FHTs reactive  cvx /-2  Risks, benefits and alternatives of ku induction of labor reviewed and patient agrees. Gregorio Garcia MD, MD  Ku placed without difficulty    No current evidence of pre eclampsia    Discussion with the patient and/ or family for proposed care, treatment, services; benefits, risks, side effects; likelihood of achieving goals and potential problems that may occur during recuperation was had and all questions were answered. Discussion with the patient and/ or family of reasonable alternatives to the proposed care, treatment, services and the discussion of the risks, benefits, side effects related to the alternatives and the risk related to not receiving the proposed care treatment services was also had and all questions were answered. If this is for an elective surgical procedure then The patient was counseled at length about the risks of betty Covid-19 during their perioperative period and any recovery window from their procedure. The patient was made aware that betty Covid-19  may worsen their prognosis for recovering from their procedure  and lend to a higher morbidity and/or mortality risk. All material risks, benefits, and reasonable alternatives including postponing the procedure were discussed. The patient  does wish to proceed with the procedure at this time.              Gregorio Garcia MD [facility]  Electronically signed 6/29/2021 at 12:38 AM

## 2021-06-29 NOTE — PLAN OF CARE
Problem: Pain:  Description: Pain management should include both nonpharmacologic and pharmacologic interventions.   Goal: Pain level will decrease  Description: Pain level will decrease  6/29/2021 1923 by Ruchi Gramajo RN  Outcome: Completed  6/29/2021 0824 by Ruchi Gramajo RN  Note: Pt denies pain at this time     Problem: Discharge Planning:  Goal: Discharged to appropriate level of care  Description: Discharged to appropriate level of care  6/29/2021 1923 by Ruchi Gramajo RN  Outcome: Completed  6/29/2021 0824 by Ruchi Gramajo RN  Note: Plans discharge to home after postpartum period     Problem: Anxiety:  Goal: Level of anxiety will decrease  Description: Level of anxiety will decrease  6/29/2021 1923 by Ruchi Gramajo RN  Outcome: Completed  6/29/2021 0824 by Ruchi Gramajo RN  Note: Pt denies anxiety      Problem: Breathing Pattern - Ineffective:  Goal: Able to breathe comfortably  Description: Able to breathe comfortably  6/29/2021 1923 by Ruchi Gramajo RN  Outcome: Completed  6/29/2021 0824 by Ruchi Gramajo RN  Note: RR 18 clear and easy     Problem: Fluid Volume - Imbalance:  Goal: Absence of intrapartum hemorrhage signs and symptoms  Description: Absence of intrapartum hemorrhage signs and symptoms  6/29/2021 1923 by Ruchi Gramajo RN  Outcome: Completed  6/29/2021 0824 by Ruchi Gramajo RN  Note: No vaginal bleeding at this time     Problem: Infection - Intrapartum Infection:  Goal: Will show no infection signs and symptoms  Description: Will show no infection signs and symptoms  6/29/2021 1923 by Ruchi Gramajo RN  Outcome: Completed  6/29/2021 0824 by Ruchi Gramajo RN  Note: WBC's wnl and pt afebrile     Problem: Labor Process - Prolonged:  Goal: Uterine contractions within specified parameters  Description: Uterine contractions within specified parameters  6/29/2021 1923 by Ruchi Gramajo RN  Outcome: Completed  6/29/2021 0824 by Ruchi Gramajo RN  Note: Contractions every 2 minutes. Pit running for induction. Plans AROM. Problem: Pain - Acute:  Goal: Able to cope with pain  Description: Able to cope with pain  6/29/2021 1923 by Lb Boston RN  Outcome: Completed  6/29/2021 0824 by Lb Boston RN  Note: Plans epidural for pain relief. Pain goal 6/10. Problem: Tissue Perfusion - Uteroplacental, Altered:  Description: [TRUNCATED] For intrapartum patients with recurrent variable decelerations of the fetal heart rate, consider transcervical amnioinfusion. For patients in labor, avoid prophylactic use of continuous maternal oxygen supplementation to prevent nonreassu . ..   Goal: Absence of abnormal fetal heart rate pattern  Description: Absence of abnormal fetal heart rate pattern  6/29/2021 1923 by Lb Boston RN  Outcome: Completed  6/29/2021 0824 by Lb Boston RN  Note: FHT's reactive at this time     Problem: Urinary Retention:  Goal: Urinary elimination within specified parameters  Description: Urinary elimination within specified parameters  6/29/2021 1923 by Lb Boston RN  Outcome: Completed  6/29/2021 0824 by Lb Boston RN  Note: Voiding qs

## 2021-06-30 LAB
HCT VFR BLD CALC: 26.3 % (ref 37–47)
HEMOGLOBIN: 7.8 GM/DL (ref 12–16)

## 2021-06-30 PROCEDURE — 85018 HEMOGLOBIN: CPT

## 2021-06-30 PROCEDURE — 36415 COLL VENOUS BLD VENIPUNCTURE: CPT

## 2021-06-30 PROCEDURE — 85014 HEMATOCRIT: CPT

## 2021-06-30 PROCEDURE — 1220000000 HC SEMI PRIVATE OB R&B

## 2021-06-30 PROCEDURE — 6370000000 HC RX 637 (ALT 250 FOR IP): Performed by: OBSTETRICS & GYNECOLOGY

## 2021-06-30 RX ADMIN — IBUPROFEN 800 MG: 800 TABLET, FILM COATED ORAL at 20:53

## 2021-06-30 RX ADMIN — DOCUSATE SODIUM 100 MG: 100 CAPSULE, LIQUID FILLED ORAL at 09:00

## 2021-06-30 RX ADMIN — FERROUS SULFATE TAB 325 MG (65 MG ELEMENTAL FE) 325 MG: 325 (65 FE) TAB at 09:00

## 2021-06-30 RX ADMIN — DOCUSATE SODIUM 100 MG: 100 CAPSULE, LIQUID FILLED ORAL at 20:53

## 2021-06-30 ASSESSMENT — PAIN SCALES - GENERAL: PAINLEVEL_OUTOF10: 2

## 2021-06-30 NOTE — PLAN OF CARE
Problem: Discharge Planning:  Goal: Discharged to appropriate level of care  Description: Discharged to appropriate level of care  Outcome: Ongoing  Note: Discharge planning continues     Problem: Constipation:  Goal: Bowel elimination is within specified parameters  Description: Bowel elimination is within specified parameters  Outcome: Ongoing  Note: Passing gas no BM     Problem: Fluid Volume - Imbalance:  Goal: Absence of postpartum hemorrhage signs and symptoms  Description: Absence of postpartum hemorrhage signs and symptoms  Outcome: Ongoing  Note: Small amount of lochia, no clots     Problem: Infection - Risk of, Puerperal Infection:  Goal: Will show no infection signs and symptoms  Description: Will show no infection signs and symptoms  Outcome: Ongoing  Note: Vitals stable     Problem: Mood - Altered:  Goal: Mood stable  Description: Mood stable  Outcome: Ongoing  Note: Stable mood expresses needs     Problem: Pain - Acute:  Goal: Pain level will decrease  Description: Pain level will decrease  Outcome: Ongoing  Note: Pain controlled well with Motrin, Tylenol, rest, pain goal 4   Care plan reviewed with patient. Patient verbalizes understanding of the plan of care and contribute to goal setting.

## 2021-06-30 NOTE — PLAN OF CARE
Problem: Discharge Planning:  Goal: Discharged to appropriate level of care  Description: Discharged to appropriate level of care  Outcome: Ongoing  Note: Remains in hospital, discussed possible discharge needs. Problem: Constipation:  Goal: Bowel elimination is within specified parameters  Description: Bowel elimination is within specified parameters  Outcome: Ongoing  Note: No bm this shift. Problem: Fluid Volume - Imbalance:  Goal: Absence of imbalanced fluid volume signs and symptoms  Description: Absence of imbalanced fluid volume signs and symptoms  Outcome: Ongoing  Note: Pt has edema bilaterally. Problem: Fluid Volume - Imbalance:  Goal: Absence of postpartum hemorrhage signs and symptoms  Description: Absence of postpartum hemorrhage signs and symptoms  Outcome: Ongoing  Note: No signs of hemorrhage. Problem: Infection - Risk of, Puerperal Infection:  Goal: Will show no infection signs and symptoms  Description: Will show no infection signs and symptoms  Outcome: Ongoing  Note: No signs of infection. Problem: Mood - Altered:  Goal: Mood stable  Description: Mood stable  Outcome: Ongoing  Note: Bonding with baby, participating in infant care. Problem: Pain - Acute:  Goal: Pain level will decrease  Description: Pain level will decrease  Outcome: Ongoing  Note: Pain controlled with po meds. Discussed ice for perineal pain and/or incisional pain or the use of warm blanket/heating pad for uterine cramps. Pt states her pain goal 4/10 has been met. Care plan reviewed with patient and she contributes to goal setting and voices understanding of plan of care.

## 2021-06-30 NOTE — LACTATION NOTE
Provided and discussed breastfeeding booklet with pt. Encouraged pt. To call out for assistance. Encouraged pt. To burp infant before feeds.

## 2021-06-30 NOTE — FLOWSHEET NOTE
Up to BR with assist to void large amount, Pericare instructions given, voices understanding, Fundus firm , midline, U/1 after voiding, small amount of bleeding noted.

## 2021-06-30 NOTE — FLOWSHEET NOTE
Patient transferred to [facility] in stable condition via w/c with infant in her arms and FOB at their side. Report given to [facility].

## 2021-06-30 NOTE — PROGRESS NOTES
Department of Obstetrics and Gynecology  Labor and Delivery  Attending Post Partum Progress Note    PPD #1    SUBJECTIVE: Feeling well. No complaints. OBJECTIVE:     Vitals:  BP (!) 111/59   Pulse 84   Temp 98.1 °F (36.7 °C)   Resp 16   Wt 215 lb (97.5 kg)   LMP  (LMP Unknown)   SpO2 99%   Breastfeeding Unknown   BMI 33.67 kg/m²     Uterus:  normal size, well involuted, firm, non-tender    DATA:    Hemoglobin:   Lab Results   Component Value Date    HGB 7.8 06/30/2021       ASSESSMENT & PLAN:  Doing well. Anticipate home on post partum day #2.     Barbie Manzanares MD 6/30/2021

## 2021-07-01 VITALS
WEIGHT: 215 LBS | BODY MASS INDEX: 33.67 KG/M2 | HEART RATE: 84 BPM | TEMPERATURE: 98.1 F | DIASTOLIC BLOOD PRESSURE: 68 MMHG | SYSTOLIC BLOOD PRESSURE: 124 MMHG | OXYGEN SATURATION: 99 % | RESPIRATION RATE: 16 BRPM

## 2021-07-01 PROCEDURE — 90471 IMMUNIZATION ADMIN: CPT | Performed by: OBSTETRICS & GYNECOLOGY

## 2021-07-01 PROCEDURE — 6360000002 HC RX W HCPCS: Performed by: OBSTETRICS & GYNECOLOGY

## 2021-07-01 PROCEDURE — 6370000000 HC RX 637 (ALT 250 FOR IP): Performed by: OBSTETRICS & GYNECOLOGY

## 2021-07-01 PROCEDURE — 90707 MMR VACCINE SC: CPT | Performed by: OBSTETRICS & GYNECOLOGY

## 2021-07-01 RX ADMIN — DOCUSATE SODIUM 100 MG: 100 CAPSULE, LIQUID FILLED ORAL at 08:37

## 2021-07-01 RX ADMIN — MEASLES, MUMPS, AND RUBELLA VIRUS VACCINE LIVE 0.5 ML: 1000; 12500; 1000 INJECTION, POWDER, LYOPHILIZED, FOR SUSPENSION SUBCUTANEOUS at 08:44

## 2021-07-01 RX ADMIN — FERROUS SULFATE TAB 325 MG (65 MG ELEMENTAL FE) 325 MG: 325 (65 FE) TAB at 08:37

## 2021-07-01 RX ADMIN — ACETAMINOPHEN 650 MG: 325 TABLET ORAL at 11:56

## 2021-07-01 RX ADMIN — IBUPROFEN 800 MG: 800 TABLET, FILM COATED ORAL at 08:38

## 2021-07-01 ASSESSMENT — PAIN SCALES - GENERAL
PAINLEVEL_OUTOF10: 2
PAINLEVEL_OUTOF10: 2

## 2021-07-01 NOTE — PLAN OF CARE
Problem: Discharge Planning:  Goal: Discharged to appropriate level of care  Description: Discharged to appropriate level of care  7/1/2021 1014 by Camryn Mancuso RN  Outcome: Met This Shift  Note: No discharge needs voiced from patient at this time. Patient expected to be discharged home with family. Care plan reviewed with patient. Patient verbalizes understanding of the plan of care and contribute to goal setting.

## 2021-07-01 NOTE — PLAN OF CARE
Problem: Discharge Planning:  Goal: Discharged to appropriate level of care  Description: Discharged to appropriate level of care  7/1/2021 0148 by Page Pineda RN  Outcome: Ongoing  Note: Remains in hospital, discussed possible discharge needs. Problem: Constipation:  Goal: Bowel elimination is within specified parameters  Description: Bowel elimination is within specified parameters  7/1/2021 0148 by Page Pineda RN  Outcome: Ongoing  Note: PT given colace educated on high fiber diet. Problem: Fluid Volume - Imbalance:  Goal: Absence of postpartum hemorrhage signs and symptoms  Description: Absence of postpartum hemorrhage signs and symptoms  7/1/2021 0148 by Page Pineda RN  Outcome: Ongoing  Note: Small to scant amt of lochia. Problem: Infection - Risk of, Puerperal Infection:  Goal: Will show no infection signs and symptoms  Description: Will show no infection signs and symptoms  7/1/2021 0148 by Page Pineda RN  Outcome: Ongoing  Note: No clots. Problem: Mood - Altered:  Goal: Mood stable  Description: Mood stable  7/1/2021 0148 by Page Pineda RN  Outcome: Ongoing  Note: Bonding with baby, participating in infant care. Problem: Pain - Acute:  Goal: Pain level will decrease  Description: Pain level will decrease  7/1/2021 0148 by Page Pineda RN  Outcome: Ongoing  Note: Pain controlled with po meds. Discussed ice for perineal pain and/or incisional pain or the use of warm blanket/heating pad for uterine cramps. Pt states her pain goal 4/10 has been met. Care plan reviewed with patient and she contributes to goal setting and voices understanding of plan of care.

## 2021-07-01 NOTE — DISCHARGE SUMMARY
Vaginal Delivery Discharge Summary    Gestational Age:40w0d    Antepartum complications: none    Admission date: 2021  5:39 PM      Type of Delivery:      [place] Data  Information for the patient's :  Delfino Ashton, Baby Girl Katy Tan [[id number]]   female   Birth Weight: 6 lb 14.4 oz (3.13 kg)       Labs: CBC   Lab Results   Component Value Date    HGB 7.8 (L) 2021    HCT 26.3 (L) 2021        Intrapartum complications: None    Postpartum complications: none    The patient is ambulating well. The patient is tolerating a normal diet. Patient Instructions: Activity: activity as tolerated and no sex for 6 weeks  Diet: regular  Wound Care: as directed    Discharge Information  Current Discharge Medication List      CONTINUE these medications which have NOT CHANGED    Details   acetaminophen (TYLENOL) 500 MG tablet Take 1,000 mg by mouth as needed for Pain (headache)      Multiple Vitamins-Minerals (ADULT GUMMY PO) Take 2 Doses by mouth daily      Prenatal MV-Min-Fe Fum-FA-DHA (PRENATAL 1 PO) Take by mouth      FLUoxetine (PROZAC) 40 MG capsule Take 1 capsule by mouth daily  Qty: 30 capsule, Refills: 5    Associated Diagnoses: Major depressive disorder, recurrent episode, moderate (HCC)             No discharge procedures on file.     Condition: Good    Plan:   Follow up in 5 week(s)    Electronically signed by Arabella Parker MD on 2021 at 9:00 AM

## 2021-07-01 NOTE — LACTATION NOTE
Met with pt in room. Offered support to pt and discussed calling for questions prn. Pt quiet and did not have concerns.

## 2021-07-01 NOTE — PROGRESS NOTES
Department of Obstetrics and Gynecology  Labor and Delivery  Attending Post Partum Progress Note    PPD #2    SUBJECTIVE: Feeling well. No complaints. OBJECTIVE:     Vitals:  /68   Pulse 84   Temp 98.1 °F (36.7 °C)   Resp 16   Wt 215 lb (97.5 kg)   LMP  (LMP Unknown)   SpO2 99%   Breastfeeding Unknown   BMI 33.67 kg/m²     Uterus:  normal size, well involuted, firm, non-tender    DATA:    Hemoglobin:   Lab Results   Component Value Date    HGB 7.8 06/30/2021       ASSESSMENT & PLAN:  Doing well. Anticipate home on post partum day #2.     Arabella Parker MD 7/1/2021

## 2021-07-01 NOTE — PROGRESS NOTES
Discharge teaching and instructions for diagnosis/procedure of pospartum care completed with patient using teachback method. AVS reviewed. Patient voiced understanding regarding medications, follow up appointments, and care of self at home.  Discharged ambulatory and in a wheelchair to  home with support per family

## 2021-07-01 NOTE — PROGRESS NOTES
Postpartum education brochure given, teaching complete. [place] postpartum depression screening discussed with patient. [facility] and contact her healthcare provider if her score is > 10. Patient voiced understanding. Reviewed postpartum birth warning signs flyer with patient. Patient has voiced understanding of teaching. Mother's blood type is o positive. Mother did not receive Rhogam.    Infant has roomed in with mother this shift. Benefits of rooming in discussed.

## 2021-07-02 ENCOUNTER — TELEPHONE (OUTPATIENT)
Dept: FAMILY MEDICINE CLINIC | Age: 21
End: 2021-07-02

## 2021-07-02 DIAGNOSIS — F33.1 MAJOR DEPRESSIVE DISORDER, RECURRENT EPISODE, MODERATE (HCC): ICD-10-CM

## 2021-07-02 RX ORDER — FLUOXETINE HYDROCHLORIDE 40 MG/1
40 CAPSULE ORAL DAILY
Qty: 30 CAPSULE | Refills: 5 | Status: SHIPPED | OUTPATIENT
Start: 2021-07-02 | End: 2021-07-07 | Stop reason: SDUPTHER

## 2021-07-02 NOTE — TELEPHONE ENCOUNTER
[facility] Transitions Initial Follow Up Call    Outreach made within 2 business days of discharge: Yes    Patient: Liu Smith Patient : 2000   MRN: [id number]  Reason for Admission: There are no discharge diagnoses documented for the most recent discharge. Discharge Date: 21       Spoke with: Christel Gallegos    Discharge department/facility: [address] [place] [address] Interactive Patient Contact:  Was patient able to fill all prescriptions: no new meds   Was patient instructed to bring all medications to the follow-up visit: Yes  Is patient taking all medications as directed in the discharge summary?  Yes  Does patient understand their discharge instructions: Yes  Does patient have questions or concerns that need addressed prior to 7-14 day follow up office visit: no    Scheduled appointment with PCP within 7-14 days    Follow Up  Pt has a f/u appt scheduled with Dr Emmy May LPN

## 2021-07-07 DIAGNOSIS — F33.1 MAJOR DEPRESSIVE DISORDER, RECURRENT EPISODE, MODERATE (HCC): ICD-10-CM

## 2021-07-07 RX ORDER — FLUOXETINE HYDROCHLORIDE 40 MG/1
40 CAPSULE ORAL DAILY
Qty: 90 CAPSULE | Refills: 3 | Status: SHIPPED | OUTPATIENT
Start: 2021-07-07

## 2021-07-07 NOTE — TELEPHONE ENCOUNTER
----- Message from Amiraamina Martino sent at 7/7/2021  8:33 AM EDT -----  Subject: Refill Request    QUESTIONS  Name of Medication? FLUoxetine (PROZAC) 40 MG capsule  Patient-reported dosage and instructions? 1 40MG tablet once a day  How many days do you have left? 0  Preferred Pharmacy? [facility] phone number (if available)? [phone]  Additional Information for Provider? Patient would like her prescription   Refilled. ---------------------------------------------------------------------------  --------------  Isidro Hinders INFO  What is the best way for the office to contact you? OK to leave message on   voicemail, OK to respond with electronic message via [facility] portal (only   for patients who have registered [facility] account)  Preferred Call Back Phone Number?  [phone]

## 2021-12-29 ENCOUNTER — HOSPITAL ENCOUNTER (EMERGENCY)
Age: 21
Discharge: HOME | End: 2021-12-29
Payer: MEDICARE

## 2021-12-29 VITALS
HEART RATE: 86 BPM | WEIGHT: 200 LBS | DIASTOLIC BLOOD PRESSURE: 73 MMHG | RESPIRATION RATE: 19 BRPM | SYSTOLIC BLOOD PRESSURE: 131 MMHG | BODY MASS INDEX: 31.32 KG/M2 | TEMPERATURE: 97.9 F | OXYGEN SATURATION: 100 %

## 2021-12-29 DIAGNOSIS — R42 VERTIGO: Primary | ICD-10-CM

## 2021-12-29 LAB
ALBUMIN SERPL-MCNC: 4.8 G/DL (ref 3.5–5.1)
ALP BLD-CCNC: 101 U/L (ref 38–126)
ALT SERPL-CCNC: 27 U/L (ref 11–66)
ANION GAP SERPL CALCULATED.3IONS-SCNC: 12 MEQ/L (ref 8–16)
AST SERPL-CCNC: 29 U/L (ref 5–40)
BASOPHILS # BLD: 1 %
BASOPHILS ABSOLUTE: 0.1 THOU/MM3 (ref 0–0.1)
BILIRUB SERPL-MCNC: < 0.2 MG/DL (ref 0.3–1.2)
BILIRUBIN URINE: NEGATIVE
BLOOD, URINE: NEGATIVE
BUN BLDV-MCNC: 9 MG/DL (ref 7–22)
CALCIUM SERPL-MCNC: 10.2 MG/DL (ref 8.5–10.5)
CHARACTER, URINE: CLEAR
CHLORIDE BLD-SCNC: 103 MEQ/L (ref 98–111)
CO2: 25 MEQ/L (ref 23–33)
COLOR: YELLOW
CREAT SERPL-MCNC: 0.8 MG/DL (ref 0.4–1.2)
EOSINOPHIL # BLD: 1.4 %
EOSINOPHILS ABSOLUTE: 0.1 THOU/MM3 (ref 0–0.4)
ERYTHROCYTE [DISTWIDTH] IN BLOOD BY AUTOMATED COUNT: 16.3 % (ref 11.5–14.5)
ERYTHROCYTE [DISTWIDTH] IN BLOOD BY AUTOMATED COUNT: 44.9 FL (ref 35–45)
GFR SERPL CREATININE-BSD FRML MDRD: 89 ML/MIN/1.73M2
GLUCOSE BLD-MCNC: 102 MG/DL (ref 70–108)
GLUCOSE URINE: NEGATIVE MG/DL
HCT VFR BLD CALC: 40 % (ref 37–47)
HEMOGLOBIN: 11.9 GM/DL (ref 12–16)
IMMATURE GRANS (ABS): 0.02 THOU/MM3 (ref 0–0.07)
IMMATURE GRANULOCYTES: 0.3 %
KETONES, URINE: NEGATIVE
LEUKOCYTE ESTERASE, URINE: NEGATIVE
LYMPHOCYTES # BLD: 27 %
LYMPHOCYTES ABSOLUTE: 1.9 THOU/MM3 (ref 1–4.8)
MCH RBC QN AUTO: 22.9 PG (ref 26–33)
MCHC RBC AUTO-ENTMCNC: 29.8 GM/DL (ref 32.2–35.5)
MCV RBC AUTO: 77.1 FL (ref 81–99)
MONOCYTES # BLD: 9.6 %
MONOCYTES ABSOLUTE: 0.7 THOU/MM3 (ref 0.4–1.3)
NITRITE, URINE: NEGATIVE
NUCLEATED RED BLOOD CELLS: 0 /100 WBC
OSMOLALITY CALCULATION: 278.3 MOSMOL/KG (ref 275–300)
PH UA: 6.5 (ref 5–9)
PLATELET # BLD: 335 THOU/MM3 (ref 130–400)
PMV BLD AUTO: 9.4 FL (ref 9.4–12.4)
POTASSIUM REFLEX MAGNESIUM: 4.2 MEQ/L (ref 3.5–5.2)
PREGNANCY, SERUM: NEGATIVE
PROTEIN UA: NEGATIVE
RBC # BLD: 5.19 MILL/MM3 (ref 4.2–5.4)
SEG NEUTROPHILS: 60.7 %
SEGMENTED NEUTROPHILS ABSOLUTE COUNT: 4.4 THOU/MM3 (ref 1.8–7.7)
SODIUM BLD-SCNC: 140 MEQ/L (ref 135–145)
SPECIFIC GRAVITY, URINE: 1.01 (ref 1–1.03)
TOTAL PROTEIN: 8 G/DL (ref 6.1–8)
UROBILINOGEN, URINE: 0.2 EU/DL (ref 0–1)
WBC # BLD: 7.2 THOU/MM3 (ref 4.8–10.8)

## 2021-12-29 PROCEDURE — 81003 URINALYSIS AUTO W/O SCOPE: CPT

## 2021-12-29 PROCEDURE — 85025 COMPLETE CBC W/AUTO DIFF WBC: CPT

## 2021-12-29 PROCEDURE — 84703 CHORIONIC GONADOTROPIN ASSAY: CPT

## 2021-12-29 PROCEDURE — 36415 COLL VENOUS BLD VENIPUNCTURE: CPT

## 2021-12-29 PROCEDURE — 99282 EMERGENCY DEPT VISIT SF MDM: CPT

## 2021-12-29 PROCEDURE — 6370000000 HC RX 637 (ALT 250 FOR IP): Performed by: NURSE PRACTITIONER

## 2021-12-29 PROCEDURE — 93005 ELECTROCARDIOGRAM TRACING: CPT | Performed by: EMERGENCY MEDICINE

## 2021-12-29 PROCEDURE — 80053 COMPREHEN METABOLIC PANEL: CPT

## 2021-12-29 RX ORDER — MECLIZINE HYDROCHLORIDE CHEWABLE TABLETS 25 MG/1
50 TABLET, CHEWABLE ORAL ONCE
Status: COMPLETED | OUTPATIENT
Start: 2021-12-29 | End: 2021-12-29

## 2021-12-29 RX ORDER — MECLIZINE HYDROCHLORIDE 25 MG/1
25 TABLET ORAL 3 TIMES DAILY PRN
Qty: 30 TABLET | Refills: 0 | Status: SHIPPED | OUTPATIENT
Start: 2021-12-29 | End: 2022-01-08

## 2021-12-29 RX ADMIN — MECLIZINE HYDROCHLORIDE 50 MG: 25 TABLET, CHEWABLE ORAL at 21:14

## 2021-12-29 ASSESSMENT — ENCOUNTER SYMPTOMS
EYE PAIN: 0
DIARRHEA: 0
CONSTIPATION: 0
COUGH: 0
ABDOMINAL PAIN: 0
NAUSEA: 1
WHEEZING: 0
SHORTNESS OF BREATH: 0
VOMITING: 1
BLOOD IN STOOL: 0
RHINORRHEA: 0

## 2021-12-29 NOTE — ED NOTES
Patient to the ED with complaint of dizziness, light headedness and vomiting. Patient states that today she has been having intermittent dizziness with vomiting while standing up and walking. She denies sick contacts or any other complaints. Patient states that she has recently had a positive pregnancy test and notes that she has been having vaginal bleeding as well.       Lizy Sidhu RN  12/29/21 1810

## 2021-12-30 NOTE — ED PROVIDER NOTES
[facility] [address] [address] EMERGENCY DEPT  EMERGENCY MEDICINE     Pt Name: Criss Monaco  MRN: [id number]  Jennifergfjeremy 2000  Date of evaluation: 12/29/2021  PCP:    Brenda Ratliff DO  Provider: LALITHA Nur - CNP    CHIEF COMPLAINT       Chief Complaint   Patient presents with    Dizziness           [facility]    Eliana Lange is a 62-year-old female patient that presents to ER with complaint of dizziness. She states that the dizziness has been intermittent and she does not attribute it to when she is moving. She states that the dizziness is like when the room is spinning and afterwards she becomes nauseated and vomits. She denies chest pain, shortness of breath, diarrhea, cough or fever. Triage notes and Nursing notes were reviewed by myself. Any discrepancies are addressed above. PAST MEDICAL HISTORY     Past Medical History:   Diagnosis Date    Anemia     Says maybe anemic but not sure.  Depression     Environmental and seasonal allergies     Heart abnormality     heart murmur     Heart murmur     Heart murmur        SURGICAL HISTORY       Past Surgical History:   Procedure Laterality Date    BREAST BIOPSY Left 2019    Biopsy was ok.     WISDOM TOOTH EXTRACTION         CURRENT MEDICATIONS       Discharge Medication List as of 12/29/2021  9:09 PM      CONTINUE these medications which have NOT CHANGED    Details   FLUoxetine (PROZAC) 40 MG capsule Take 1 capsule by mouth daily, Disp-90 capsule, R-3Normal      Multiple Vitamins-Minerals (ADULT GUMMY PO) Take 2 Doses by mouth dailyHistorical Med      acetaminophen (TYLENOL) 500 MG tablet Take 1,000 mg by mouth as needed for Pain (headache)Historical Med      Prenatal MV-Min-Fe Fum-FA-DHA (PRENATAL 1 PO) Take by mouthHistorical Med             ALLERGIES     No Known Allergies    FAMILY HISTORY       Family History   Problem Relation Age of Onset    Ovarian Cancer Other     Breast Cancer Maternal Great Grandmother         under age 48   Prisca Stvee Heart Attack Maternal Grandfather         SOCIAL HISTORY       Social History     Socioeconomic History    Marital status: Single     Spouse name: Not on file    Number of children: Not on file    Years of education: Not on file    Highest education level: Not on file   Occupational History    Not on file   Tobacco Use    Smoking status: Never Smoker    Smokeless tobacco: Never Used   Vaping Use    Vaping Use: Former    Substances: Nicotine    Devices: Disposable   Substance and Sexual Activity    Alcohol use: No     Alcohol/week: 0.0 standard drinks    Drug use: No     Comment: Says never but prenatal say history of THC    Sexual activity: Yes   Other Topics Concern    Not on file   Social History Narrative    Not on file     Social Determinants of Health     Financial Resource Strain:     Difficulty of Paying Living Expenses: Not on file   Food Insecurity:     Worried About [employer] [employer] in the Last Year: Not on file    [address] [employer] in the Last Year: Not on file   Transportation Needs:     Lack of Transportation (Medical): Not on file    Lack of Transportation (Non-Medical):  Not on file   Physical Activity:     Days of Exercise per Week: Not on file    Minutes of Exercise per Session: Not on file   Stress:     Feeling of Stress : Not on file   Social Connections:     Frequency of Communication with Friends and Family: Not on file    Frequency of Social Gatherings with Friends and Family: Not on file    Attends Latter-day Services: Not on file    Active Member of Clubs or Organizations: Not on file    Attends Club or Organization Meetings: Not on file    Marital Status: Not on file   Intimate Partner Violence:     Fear of Current or Ex-Partner: Not on file    Emotionally Abused: Not on file    Physically Abused: Not on file    Sexually Abused: Not on file   Housing Stability:     Unable to Pay for Housing in the Last Year: Not on file    Number of [place] in the Last Year: Not on file    Unstable Housing in the Last Year: Not on file       REVIEW OF SYSTEMS     Review of Systems   Constitutional: Negative for appetite change, chills, fatigue, fever and unexpected weight change. HENT: Negative for ear pain and rhinorrhea. Eyes: Negative for pain and visual disturbance. Respiratory: Negative for cough, shortness of breath and wheezing. Cardiovascular: Negative for chest pain, palpitations and leg swelling. Gastrointestinal: Positive for nausea and vomiting. Negative for abdominal pain, blood in stool, constipation and diarrhea. Genitourinary: Negative for dysuria, frequency and hematuria. Musculoskeletal: Negative for arthralgias, joint swelling and neck stiffness. Skin: Negative for rash. Neurological: Positive for dizziness. Negative for syncope, weakness, light-headedness and headaches. Hematological: Does not bruise/bleed easily. Except as noted above the remainder of the review of systems was reviewed and is negative. SCREENINGS                        PHYSICAL EXAM    (up to 7 for level 4, 8 or more for level 5)     ED Triage Vitals [12/29/21 1809]   BP Temp Temp src Pulse Resp SpO2 Height Weight   131/73 97.9 °F (36.6 °C) -- 86 19 100 % -- 200 lb (90.7 kg)       Physical Exam  Vitals and nursing note reviewed. Constitutional:       Appearance: She is not diaphoretic. HENT:      Head: Normocephalic and atraumatic. Right Ear: External ear normal.      Left Ear: External ear normal.   Eyes:      Conjunctiva/sclera: Conjunctivae normal.   Cardiovascular:      Rate and Rhythm: Normal rate. Pulmonary:      Effort: Pulmonary effort is normal. No respiratory distress. Abdominal:      General: There is no distension. Musculoskeletal:      Cervical back: Normal range of motion. Skin:     General: Skin is warm and dry. Neurological:      Mental Status: She is alert.            DIAGNOSTIC RESULTS     EKG:(none if blank)  All EKGs are interpreted by the Emergency Department Physician who either signs or Co-signs this chart in the absence of a cardiologist.    Normal sinus rhythm with no ectopy noted. RADIOLOGY: (none if blank)   I directly visualized the following images and reviewed the radiologist interpretations. Interpretation per the Radiologist below, if available at the time of this note:  No orders to display       LABS:  Labs Reviewed   CBC WITH AUTO DIFFERENTIAL - Abnormal; Notable for the following components:       Result Value    Hemoglobin 11.9 (*)     MCV 77.1 (*)     MCH 22.9 (*)     MCHC 29.8 (*)     RDW-CV 16.3 (*)     All other components within normal limits   COMPREHENSIVE METABOLIC PANEL W/ REFLEX TO MG FOR LOW K - Abnormal; Notable for the following components: Total Bilirubin <0.2 (*)     All other components within normal limits   GLOMERULAR FILTRATION RATE, ESTIMATED - Abnormal; Notable for the following components:    Est, Glom Filt Rate 89 (*)     All other components within normal limits   URINE RT REFLEX TO CULTURE   HCG, SERUM, QUALITATIVE   ANION GAP   OSMOLALITY       All other labs were within normal range or not returned as of this dictation. Please note, any cultures that may have been sent were not resulted at the time of this patient visit. EMERGENCY DEPARTMENT COURSE and Medical Decision Making:     Vitals:    Vitals:    12/29/21 1809   BP: 131/73   Pulse: 86   Resp: 19   Temp: 97.9 °F (36.6 °C)   SpO2: 100%   Weight: 200 lb (90.7 kg)       PROCEDURES: (None if blank)  Procedures    ED Course as of 12/30/21 0858   Wed Dec 29, 2021   2113 Reviewed case with Dr. Susu Payne. Okay for discharge on Antivert follow-up with primary care.  [NW]      ED Course User Index  [NW] Gala Klinefelter, APRN - CNP     MDM  Number of Diagnoses or Management Options     Amount and/or Complexity of Data Reviewed  Clinical lab tests: ordered and reviewed  Tests in the radiology section of CPT®: ordered and reviewed  Tests in the medicine section of CPT®: ordered and reviewed      Patient presents to ER with complaint of dizziness. He states that it feels like the room is spinning. Labs are reassuring. EKG is reassuring. Patient states that her dizziness is intermittent and comes and goes with nausea. Will try Antivert. She denies headache or head injury. Strict return precautions and follow up instructions were discussed with the patient with which the patient agrees    ED Medications administered this visit:    Medications   meclizine (ANTIVERT) chewable tablet 50 mg (50 mg Oral Given 12/29/21 2114)         FINAL IMPRESSION      1.  Vertigo          DISPOSITION/PLAN   DISPOSITION Decision To Discharge 12/29/2021 09:16:20 PM      PATIENT REFERRED TO:  Kelsi Harkins DO  [address] [place] [address] Dr Shalom Gresham  [phone]            DISCHARGE MEDICATIONS:  Discharge Medication List as of 12/29/2021  9:09 PM      START taking these medications    Details   meclizine (ANTIVERT) 25 MG tablet Take 1 tablet by mouth 3 times daily as needed for Dizziness or Nausea, Disp-30 tablet, R-0Normal                    LALITHA Birmingham CNP (electronically signed)           LALITHA Birmingham CNP  12/30/21 4833

## 2021-12-31 LAB
EKG ATRIAL RATE: 81 BPM
EKG P AXIS: 23 DEGREES
EKG P-R INTERVAL: 122 MS
EKG Q-T INTERVAL: 374 MS
EKG QRS DURATION: 80 MS
EKG QTC CALCULATION (BAZETT): 434 MS
EKG R AXIS: 87 DEGREES
EKG T AXIS: 32 DEGREES
EKG VENTRICULAR RATE: 81 BPM

## 2022-05-16 ENCOUNTER — OFFICE VISIT (OUTPATIENT)
Dept: FAMILY MEDICINE CLINIC | Age: 22
End: 2022-05-16
Payer: MEDICARE

## 2022-05-16 VITALS
DIASTOLIC BLOOD PRESSURE: 81 MMHG | WEIGHT: 216 LBS | OXYGEN SATURATION: 99 % | RESPIRATION RATE: 16 BRPM | SYSTOLIC BLOOD PRESSURE: 132 MMHG | HEIGHT: 67 IN | BODY MASS INDEX: 33.9 KG/M2 | TEMPERATURE: 96.7 F | HEART RATE: 95 BPM

## 2022-05-16 DIAGNOSIS — F33.1 MAJOR DEPRESSIVE DISORDER, RECURRENT EPISODE, MODERATE (HCC): ICD-10-CM

## 2022-05-16 DIAGNOSIS — R06.02 SHORTNESS OF BREATH: Primary | ICD-10-CM

## 2022-05-16 DIAGNOSIS — R51.9 CHRONIC NONINTRACTABLE HEADACHE, UNSPECIFIED HEADACHE TYPE: ICD-10-CM

## 2022-05-16 DIAGNOSIS — G89.29 CHRONIC NONINTRACTABLE HEADACHE, UNSPECIFIED HEADACHE TYPE: ICD-10-CM

## 2022-05-16 PROCEDURE — G8417 CALC BMI ABV UP PARAM F/U: HCPCS | Performed by: FAMILY MEDICINE

## 2022-05-16 PROCEDURE — 99214 OFFICE O/P EST MOD 30 MIN: CPT | Performed by: FAMILY MEDICINE

## 2022-05-16 PROCEDURE — 1036F TOBACCO NON-USER: CPT | Performed by: FAMILY MEDICINE

## 2022-05-16 PROCEDURE — 93000 ELECTROCARDIOGRAM COMPLETE: CPT | Performed by: FAMILY MEDICINE

## 2022-05-16 PROCEDURE — G8427 DOCREV CUR MEDS BY ELIG CLIN: HCPCS | Performed by: FAMILY MEDICINE

## 2022-05-16 RX ORDER — AMITRIPTYLINE HYDROCHLORIDE 25 MG/1
25 TABLET, FILM COATED ORAL NIGHTLY
Qty: 30 TABLET | Refills: 5 | Status: SHIPPED | OUTPATIENT
Start: 2022-05-16 | End: 2022-08-23

## 2022-05-16 ASSESSMENT — PATIENT HEALTH QUESTIONNAIRE - PHQ9
SUM OF ALL RESPONSES TO PHQ QUESTIONS 1-9: 2
SUM OF ALL RESPONSES TO PHQ9 QUESTIONS 1 & 2: 2
SUM OF ALL RESPONSES TO PHQ QUESTIONS 1-9: 2
2. FEELING DOWN, DEPRESSED OR HOPELESS: 1
1. LITTLE INTEREST OR PLEASURE IN DOING THINGS: 1

## 2022-05-16 NOTE — PROGRESS NOTES
Mp Abdullahi is a 20 y.o. female that presents for     Chief Complaint   Patient presents with    Headache     everyday for the past 2 months    Other     medication questions (no motivation)    Back Pain    Shortness of Breath       /81   Pulse 95   Temp 96.7 °F (35.9 °C) (Infrared)   Resp 16   Ht 5' 7\" (1.702 m)   Wt 216 lb (98 kg)   SpO2 99%   BMI 33.83 kg/m²       HPI    Headaches    HPI:    Description of headaches - starts in the back of the neck and radiates to the temples bilaterally, typically wakes up and then last until dinner time  Frequency of Headaches - daily for the past 2 months  Level of disability - can function and go to work, but causes difficulty    Currently on prophylactic therapy? No  Taking abortive therapy? Yes - excedrin/tylenol without improvement    Associated Aura? No  Photophobia, Phonophobia? Yes - phonophobia  Nausea or Vomiting? Yes - 'sometimes'  Recent change in Headaches? Yes - more frequent      Notes that she has been having decreased motivation and more difficulty concentrating recently. Some days does not feeling like she wants to get out of bed. Mood is 'not the greatest'. Feels irritable. Sleep is ok, but does not feel like she gets restful sleep. Is on prozac. Chest Pain    Location -  Diffuse chest  Symptoms have been present for 2-3 year(s). Intermittent. Occurs 1-2 times per month. Onset of symptoms was gradual.  Inciting Event? No       Description of chest pain -pressure . The pain does not radiate. Intensity - moderate. Aggravating factors - unknown. Alleviating factors -  Goes away on its own     N/V? No  SOB? Yes - feels like she cannot take a deep  Lightheadedness? No  Palpitations? No  Diaphoresis? No  Cough?   No    Cardiac risk factors (ex. smoking/tobacco exposure, family history, diabetes mellitus, hypertension, dyslipidemia, low HDL, metabolic syndrome, kidney disease, peripheral vascular disease, AAA, obesity or sedentary lifestyle)?  none    EKG - Rhythm - sinus, Rate - 74, intervals wnl, no acute ST segment changes      Health Maintenance   Topic Date Due    COVID-19 Vaccine (1) Never done    Depression Screen  Never done    Varicella vaccine (1 of 2 - 2-dose childhood series) Never done    Hepatitis C screen  Never done    Chlamydia screen  01/23/2020    Pap smear  Never done    DTaP/Tdap/Td vaccine (7 - Td or Tdap) 05/10/2021    Flu vaccine (Season Ended) 09/01/2022    Hepatitis B vaccine  Completed    Hib vaccine  Completed    HPV vaccine  Completed    Meningococcal (ACWY) vaccine  Completed    HIV screen  Completed    Hepatitis A vaccine  Aged Out    Pneumococcal 0-64 years Vaccine  Aged Out       Past Medical History:   Diagnosis Date    Anemia     Says maybe anemic but not sure.  Depression     Environmental and seasonal allergies     Heart abnormality     heart murmur     Heart murmur     Heart murmur        Past Surgical History:   Procedure Laterality Date    BREAST BIOPSY Left 2019    Biopsy was ok.  WISDOM TOOTH EXTRACTION         Social History     Tobacco Use    Smoking status: Never Smoker    Smokeless tobacco: Never Used   Vaping Use    Vaping Use: Former    Substances: Nicotine    Devices: Disposable   Substance Use Topics    Alcohol use: No     Alcohol/week: 0.0 standard drinks    Drug use: No     Comment: Says never but prenatal say history of THC       Family History   Problem Relation Age of Onset    Ovarian Cancer Other     Breast Cancer Maternal Great Grandmother         under age 48    Heart Attack Maternal Grandfather          I have reviewed the patient's past medical history, past surgical history, allergies, medications, social and family history and I have made updates where appropriate.     Review of Systems        PHYSICAL EXAM:  /81   Pulse 95   Temp 96.7 °F (35.9 °C) (Infrared)   Resp 16   Ht 5' 7\" (1.702 m)   Wt 216 lb (98 kg) SpO2 99%   BMI 33.83 kg/m²     Physical Exam  Vitals and nursing note reviewed. Constitutional:       General: She is not in acute distress. Appearance: She is well-developed. HENT:      Head: Normocephalic and atraumatic. Right Ear: Hearing and external ear normal.      Left Ear: Hearing and external ear normal.      Nose: Nose normal.   Eyes:      General:         Right eye: No discharge. Left eye: No discharge. Conjunctiva/sclera: Conjunctivae normal.   Neck:      Thyroid: No thyromegaly. Trachea: No tracheal deviation. Cardiovascular:      Rate and Rhythm: Normal rate and regular rhythm. Heart sounds: Normal heart sounds. No murmur heard. No friction rub. No gallop. Pulmonary:      Effort: Pulmonary effort is normal. No respiratory distress. Breath sounds: No wheezing or rales. Chest:      Chest wall: No tenderness. Musculoskeletal:         General: No deformity. Cervical back: Normal range of motion and neck supple. Lymphadenopathy:      Cervical: No cervical adenopathy. Skin:     General: Skin is warm and dry. Findings: No erythema or rash. Neurological:      Mental Status: She is alert. Motor: No abnormal muscle tone. Coordination: Coordination normal.   Psychiatric:         Behavior: Behavior normal.         Thought Content: Thought content normal.         Judgment: Judgment normal.             ASSESSMENT & PLAN  Mariangel was seen today for headache, other, back pain and shortness of breath. Diagnoses and all orders for this visit:    Shortness of breath  -     EKG 12 Lead    Chronic nonintractable headache, unspecified headache type  -     amitriptyline (ELAVIL) 25 MG tablet; Take 1 tablet by mouth nightly    Major depressive disorder, recurrent episode, moderate ([address]        Return in about 1 month (around 6/16/2022). Symptoms consistent with new onset HAs and uncontrolled MDD. Alot of this appears related to stress.   We will try adding elavil to address both and then recheck in 1 month. Chest pain consistent with an MSK etiology, she is going to seek care with a chiropractor. The most recent results of the following tests were reviewed with the patient today: EKG    All copied or forwarded information in the progress note was verified by me to be accurate at the time of visit  Patient's past medical, surgical, social and family history were reviewed and updated     All patient questions answered. Patient voiced understanding.

## 2022-08-23 ENCOUNTER — APPOINTMENT (OUTPATIENT)
Dept: GENERAL RADIOLOGY | Age: 22
End: 2022-08-23
Payer: MEDICARE

## 2022-08-23 ENCOUNTER — HOSPITAL ENCOUNTER (EMERGENCY)
Age: 22
Discharge: HOME | End: 2022-08-24
Attending: EMERGENCY MEDICINE
Payer: MEDICARE

## 2022-08-23 VITALS
OXYGEN SATURATION: 98 % | HEIGHT: 67 IN | WEIGHT: 210 LBS | BODY MASS INDEX: 32.96 KG/M2 | TEMPERATURE: 97 F | DIASTOLIC BLOOD PRESSURE: 77 MMHG | RESPIRATION RATE: 16 BRPM | HEART RATE: 82 BPM | SYSTOLIC BLOOD PRESSURE: 121 MMHG

## 2022-08-23 DIAGNOSIS — S93.401A SPRAIN AND STRAIN OF RIGHT ANKLE: Primary | ICD-10-CM

## 2022-08-23 DIAGNOSIS — S96.911A SPRAIN AND STRAIN OF RIGHT ANKLE: Primary | ICD-10-CM

## 2022-08-23 PROCEDURE — 73630 X-RAY EXAM OF FOOT: CPT

## 2022-08-23 PROCEDURE — 73610 X-RAY EXAM OF ANKLE: CPT

## 2022-08-23 PROCEDURE — 99283 EMERGENCY DEPT VISIT LOW MDM: CPT

## 2022-08-23 ASSESSMENT — PAIN SCALES - GENERAL: PAINLEVEL_OUTOF10: 6

## 2022-08-23 ASSESSMENT — PAIN DESCRIPTION - ORIENTATION: ORIENTATION: RIGHT

## 2022-08-23 ASSESSMENT — PAIN DESCRIPTION - LOCATION: LOCATION: ANKLE

## 2022-08-24 NOTE — ED NOTES
Presents per self c/o right ankle pain that began after a fall 2 months ago then again 2 weeks ago. States she was at work this evening and the pain was worse after standing on concrete. No visible injury.  Triage exam room 1850 Eliceo Childress RN  08/23/22 0672

## 2022-08-24 NOTE — ED NOTES
Splint applied. Education complete. Discharge teaching and instructions for condition explained to patient. AVS reviewed. Went over prescriptions with patient. Patient voiced understanding regarding prescriptions, follow up appointments and care of self at home. Pt discharged to home in stable condition per self.        Rupert Stark, RN  08/24/22 0003

## 2022-08-24 NOTE — DISCHARGE INSTRUCTIONS
Rest, elevate, ice your ankle as needed. Wear your ankle brace when you are up and around. Follow-up with your doctor if symptoms or not resolving.

## 2022-08-24 NOTE — ED PROVIDER NOTES
[facility]  eMERGENCY dEPARTMENT eNCOUnter             Hetal Rodgers 19 COMPLAINT    Chief Complaint   Patient presents with    Foot Injury     Right foot and ankle injury 2 months ago and then again 2 weeks ago       Nurses Notes reviewed and I agree except as noted in the HPI. HPI    Cade Hernandez is a 20 y.o. female who presents with pain and swelling in the right ankle, onset with several recent inversion injuries. Pain is 6/10, aching, increased with ambulation and palpation. No treatment tried. REVIEW OF SYSTEMS      Review of Systems   Constitutional: Negative. HENT: Negative. Musculoskeletal:  Negative for falls. Neurological:  Negative for sensory change and focal weakness. Endo/Heme/Allergies:  Does not bruise/bleed easily. All other systems reviewed and are negative. PAST MEDICAL HISTORY     has a past medical history of Anemia, Depression, Environmental and seasonal allergies, Heart abnormality, Heart murmur, and Heart murmur. SURGICAL HISTORY     has a past surgical history that includes Benavides tooth extraction and Breast biopsy (Left, 2019). CURRENT MEDICATIONS    Discharge Medication List as of 2022 11:53 PM        CONTINUE these medications which have NOT CHANGED    Details   FLUoxetine (PROZAC) 40 MG capsule Take 1 capsule by mouth daily, Disp-90 capsule, R-3Normal             ALLERGIES    has No Known Allergies. FAMILY HISTORY    She indicated that her mother is alive. She indicated that her father is alive. She indicated that her maternal grandfather is . She indicated that her other is alive. She indicated that the status of her maternal great grandmother is unknown.   family history includes Breast Cancer in her maternal great grandmother; Heart Attack in her maternal grandfather; Ovarian Cancer in an other family member. SOCIAL HISTORY     reports that she has never smoked.  She has never used smokeless tobacco. She reports that she does not drink alcohol and does not use drugs. PHYSICAL EXAM       INITIAL VITALS: /77   Pulse 82   Temp 97 °F (36.1 °C)   Resp 16   Ht 5' 7\" (1.702 m)   Wt 210 lb (95.3 kg)   SpO2 98%   BMI 32.89 kg/m²      Physical Exam  Vitals reviewed. Constitutional:       Appearance: Normal appearance. HENT:      Head: Atraumatic. Eyes:      Pupils: Pupils are equal, round, and reactive to light. Cardiovascular:      Pulses: Normal pulses. Musculoskeletal:         General: Tenderness (right ankle laterally, no deformity.) present. Skin:     General: Skin is warm and dry. Capillary Refill: Capillary refill takes less than 2 seconds. Neurological:      General: No focal deficit present. Mental Status: She is alert and oriented to person, place, and time. Psychiatric:         Behavior: Behavior normal.       RADIOLOGY:    XR ANKLE RIGHT (MIN 3 VIEWS)   Final Result   No acute fracture or dislocation. This document has been electronically signed by: Olivia Brunner, MD on    08/23/2022 11:43 PM      XR FOOT RIGHT (MIN 3 VIEWS)   Final Result   No acute fracture or dislocation. This document has been electronically signed by: Olivia Brunner, MD on    08/23/2022 11:43 PM           Vitals:    Vitals:    08/23/22 2246   BP: 121/77   Pulse: 82   Resp: 16   Temp: 97 °F (36.1 °C)   SpO2: 98%   Weight: 210 lb (95.3 kg)   Height: 5' 7\" (1.702 m)       EMERGENCY DEPARTMENT COURSE:    X-ray results and plan of care discussed. Air cast applied to the right ankle. Plan of care discussed. FINAL IMPRESSION      1.  Sprain and strain of right ankle        DISPOSITION/PLAN    DISPOSITION Decision To Discharge 08/23/2022 11:53:03 PM      PATIENT REFERRED TO:    Senia Sanford DO  [address] [place] [address] Dr [address]elo [address]  [phone]    Schedule an appointment as soon as possible for a visit         DISCHARGE MEDICATIONS:    Discharge Medication List as of 8/23/2022 11:53 PM             (Please note that portions of this note were completed with a voice recognition program.  Efforts were made to edit the dictations but occasionally words are mis-transcribed.)       Rody Diaz MD  08/24/22 0030

## 2022-09-02 ENCOUNTER — HOSPITAL ENCOUNTER (EMERGENCY)
Age: 22
Discharge: HOME | End: 2022-09-02
Payer: MEDICARE

## 2022-09-02 VITALS
DIASTOLIC BLOOD PRESSURE: 73 MMHG | TEMPERATURE: 97 F | BODY MASS INDEX: 32.18 KG/M2 | SYSTOLIC BLOOD PRESSURE: 124 MMHG | HEIGHT: 67 IN | WEIGHT: 205 LBS | HEART RATE: 86 BPM | OXYGEN SATURATION: 98 % | RESPIRATION RATE: 16 BRPM

## 2022-09-02 DIAGNOSIS — O21.9 NAUSEA AND VOMITING DURING PREGNANCY: Primary | ICD-10-CM

## 2022-09-02 PROCEDURE — 99213 OFFICE O/P EST LOW 20 MIN: CPT | Performed by: NURSE PRACTITIONER

## 2022-09-02 PROCEDURE — 99213 OFFICE O/P EST LOW 20 MIN: CPT

## 2022-09-02 ASSESSMENT — PAIN SCALES - GENERAL: PAINLEVEL_OUTOF10: 8

## 2022-09-02 ASSESSMENT — ENCOUNTER SYMPTOMS
DIARRHEA: 0
EYE DISCHARGE: 0
TROUBLE SWALLOWING: 0
COUGH: 0
NAUSEA: 1
EYE REDNESS: 0
SHORTNESS OF BREATH: 0
VOMITING: 1
SORE THROAT: 0
RHINORRHEA: 0

## 2022-09-02 ASSESSMENT — PAIN DESCRIPTION - LOCATION: LOCATION: ABDOMEN

## 2022-09-02 ASSESSMENT — PAIN DESCRIPTION - DESCRIPTORS: DESCRIPTORS: ACHING

## 2022-09-02 ASSESSMENT — PAIN DESCRIPTION - FREQUENCY: FREQUENCY: CONTINUOUS

## 2022-09-02 ASSESSMENT — PAIN - FUNCTIONAL ASSESSMENT: PAIN_FUNCTIONAL_ASSESSMENT: 0-10

## 2022-09-02 ASSESSMENT — PAIN DESCRIPTION - PAIN TYPE: TYPE: ACUTE PAIN

## 2022-09-02 NOTE — ED NOTES
Patient discharge instructions given to pt and pt verbalized understanding, no other needs at this time, and pt left in stable condition.      Mary Jaime RN  09/02/22 8327

## 2022-09-02 NOTE — ED PROVIDER NOTES
[address] [employer]       Chief Complaint   Patient presents with    Nausea     Onset 3 days ago     Emesis       Nurses Notes reviewed and I agree except as noted in the HPI. HISTORY OF PRESENT ILLNESS   Maged Vargas is a 20 y.o. female who presents for evaluation of nausea/vomiting. Last menstrual period 7/20/2022. Patient states greater than 10 episodes of nonbloody emesis in the last 24 hours. Patient states she \"cannot tolerate anything. \"  She has not had appoint with her OB/GYN at this point. No treatment prior to arrival.    REVIEW OF SYSTEMS     Review of Systems   Constitutional:  Positive for appetite change. Negative for chills, diaphoresis, fatigue and fever. HENT:  Negative for congestion, ear pain, rhinorrhea, sore throat and trouble swallowing. Eyes:  Negative for discharge and redness. Respiratory:  Negative for cough and shortness of breath. Cardiovascular:  Negative for chest pain. Gastrointestinal:  Positive for nausea and vomiting. Negative for diarrhea. Genitourinary:  Negative for decreased urine volume. Musculoskeletal:  Negative for neck pain and neck stiffness. Skin:  Negative for rash. Neurological:  Negative for headaches. Hematological:  Negative for adenopathy. Psychiatric/Behavioral:  Negative for sleep disturbance. PAST MEDICAL HISTORY         Diagnosis Date    Anemia     Says maybe anemic but not sure. Depression     Environmental and seasonal allergies     Heart abnormality     heart murmur     Heart murmur     Heart murmur        SURGICAL HISTORY     Patient  has a past surgical history that includes Lamar tooth extraction and Breast biopsy (Left, 2019).     CURRENT MEDICATIONS       Discharge Medication List as of 9/2/2022  6:10 PM        CONTINUE these medications which have NOT CHANGED    Details   Prenatal MV & Min w/FA-DHA (PRENATAL ADULT GUMMY/DHA/FA) 0.4-25 MG CHEW Take by mouthHistorical Med      FLUoxetine (PROZAC) 40 MG capsule Take 1 capsule by mouth daily, Disp-90 capsule, R-3Normal             ALLERGIES     Patient is has No Known Allergies. FAMILY HISTORY     Patient'sfamily history includes Breast Cancer in her maternal great grandmother; Heart Attack in her maternal grandfather; Ovarian Cancer in an other family member. SOCIAL HISTORY     Patient  reports that she has never smoked. She has never used smokeless tobacco. She reports that she does not drink alcohol and does not use drugs. PHYSICAL EXAM     ED TRIAGE VITALS  BP: 124/73, Temp: 97 °F (36.1 °C), Heart Rate: 86, Resp: 16, SpO2: 98 %  Physical Exam  Vitals and nursing note reviewed. Constitutional:       General: She is not in acute distress. Appearance: Normal appearance. She is well-developed. She is not ill-appearing, toxic-appearing or diaphoretic. HENT:      Head: Normocephalic and atraumatic. Right Ear: Hearing, tympanic membrane, ear canal and external ear normal. No mastoid tenderness. No hemotympanum. Tympanic membrane is not perforated, erythematous or bulging. Left Ear: Hearing, tympanic membrane, ear canal and external ear normal. No mastoid tenderness. No hemotympanum. Tympanic membrane is not perforated, erythematous or bulging. Nose: Nose normal.      Mouth/Throat:      Mouth: Mucous membranes are moist.      Pharynx: Oropharynx is clear. Uvula midline. Tonsils: No tonsillar abscesses. Eyes:      General: No scleral icterus. Conjunctiva/sclera: Conjunctivae normal.      Right eye: Right conjunctiva is not injected. No hemorrhage. Left eye: Left conjunctiva is not injected. No hemorrhage. Neck:      Thyroid: No thyromegaly. Trachea: Trachea normal.   Cardiovascular:      Rate and Rhythm: Normal rate and regular rhythm. No extrasystoles are present. Chest Wall: PMI is not displaced. Heart sounds: Normal heart sounds. No murmur heard.     No friction rub. No gallop. Pulmonary:      Effort: Pulmonary effort is normal. No respiratory distress. Breath sounds: Normal breath sounds. Chest:   Breasts:     Right: No supraclavicular adenopathy. Left: No supraclavicular adenopathy. Musculoskeletal:      Cervical back: Normal range of motion and neck supple. Lymphadenopathy:      Head:      Right side of head: No submental, submandibular, tonsillar or occipital adenopathy. Left side of head: No submental, submandibular, tonsillar or occipital adenopathy. Cervical: No cervical adenopathy. Upper Body:      Right upper body: No supraclavicular adenopathy. Left upper body: No supraclavicular adenopathy. Skin:     General: Skin is warm and dry. Capillary Refill: Capillary refill takes less than 2 seconds. Coloration: Skin is not pale. Findings: No rash. Comments: Skin warm and dry to touch, no rashes noted on exposed surfaces. Neurological:      Mental Status: She is alert and oriented to person, place, and time. She is not disoriented. Psychiatric:         Mood and Affect: Mood normal.         Behavior: Behavior is cooperative. DIAGNOSTIC RESULTS   Labs: No results found for this visit on 09/02/22. IMAGING:  No orders to display     URGENT CARE COURSE:     Vitals:    09/02/22 1746   BP: 124/73   Pulse: 86   Resp: 16   Temp: 97 °F (36.1 °C)   TempSrc: Temporal   SpO2: 98%   Weight: 205 lb (93 kg)   Height: 5' 7\" (1.702 m)       Medications - No data to display  PROCEDURES:  None  FINALIMPRESSION      1. Nausea and vomiting during pregnancy        DISPOSITION/PLAN   DISPOSITION Decision To Discharge 09/02/2022 06:09:18 PM  Nontoxic, no distress. Vital signs stable. Oropharynx clear moist.  Patient given pregnancy medication list.  Advised to call OB/GYN for follow-up. If any distress go to ER.   PATIENT REFERRED TO:  Leonela Bender angel  [phone]      Follow-up with PCP as needed. Call OB/GYN for appointment. Follow-up pregnancy medication list provided. Advance diet as tolerated. If any distress go to ER.     DISCHARGE MEDICATIONS:  Discharge Medication List as of 9/2/2022  6:10 PM        Discharge Medication List as of 9/2/2022  6:10 PM          1425 Daniele Mcguire, APRN - CNP  09/02/22 3774

## 2022-09-04 ENCOUNTER — APPOINTMENT (OUTPATIENT)
Dept: ULTRASOUND IMAGING | Age: 22
End: 2022-09-04
Payer: MEDICARE

## 2022-09-04 ENCOUNTER — HOSPITAL ENCOUNTER (EMERGENCY)
Age: 22
Discharge: HOME | End: 2022-09-04
Attending: EMERGENCY MEDICINE
Payer: MEDICARE

## 2022-09-04 VITALS
RESPIRATION RATE: 16 BRPM | HEIGHT: 67 IN | BODY MASS INDEX: 32.18 KG/M2 | OXYGEN SATURATION: 100 % | DIASTOLIC BLOOD PRESSURE: 73 MMHG | WEIGHT: 205 LBS | SYSTOLIC BLOOD PRESSURE: 122 MMHG | TEMPERATURE: 98.2 F | HEART RATE: 78 BPM

## 2022-09-04 DIAGNOSIS — O21.9 NAUSEA AND VOMITING OF PREGNANCY, ANTEPARTUM: Primary | ICD-10-CM

## 2022-09-04 DIAGNOSIS — E87.6 HYPOKALEMIA: ICD-10-CM

## 2022-09-04 LAB
ALBUMIN SERPL-MCNC: 5.6 G/DL (ref 3.5–5.1)
ALP BLD-CCNC: 85 U/L (ref 38–126)
ALT SERPL-CCNC: 16 U/L (ref 11–66)
ANION GAP SERPL CALCULATED.3IONS-SCNC: 16 MEQ/L (ref 8–16)
AST SERPL-CCNC: 23 U/L (ref 5–40)
BACTERIA: ABNORMAL /HPF
BASOPHILS # BLD: 0.8 %
BASOPHILS ABSOLUTE: 0.1 THOU/MM3 (ref 0–0.1)
BILIRUB SERPL-MCNC: 0.8 MG/DL (ref 0.3–1.2)
BILIRUBIN DIRECT: < 0.2 MG/DL (ref 0–0.3)
BILIRUBIN URINE: NEGATIVE
BLOOD, URINE: NEGATIVE
BUN BLDV-MCNC: 18 MG/DL (ref 7–22)
CALCIUM SERPL-MCNC: 10.7 MG/DL (ref 8.5–10.5)
CASTS 2: ABNORMAL /LPF
CASTS UA: ABNORMAL /LPF
CHARACTER, URINE: ABNORMAL
CHLORIDE BLD-SCNC: 97 MEQ/L (ref 98–111)
CO2: 23 MEQ/L (ref 23–33)
COLOR: YELLOW
CREAT SERPL-MCNC: 0.7 MG/DL (ref 0.4–1.2)
CRYSTALS, UA: ABNORMAL
EOSINOPHIL # BLD: 0 %
EOSINOPHILS ABSOLUTE: 0 THOU/MM3 (ref 0–0.4)
EPITHELIAL CELLS, UA: ABNORMAL /HPF
ERYTHROCYTE [DISTWIDTH] IN BLOOD BY AUTOMATED COUNT: 19.1 % (ref 11.5–14.5)
ERYTHROCYTE [DISTWIDTH] IN BLOOD BY AUTOMATED COUNT: 47.9 FL (ref 35–45)
GFR SERPL CREATININE-BSD FRML MDRD: > 90 ML/MIN/1.73M2
GLUCOSE BLD-MCNC: 110 MG/DL (ref 70–108)
GLUCOSE URINE: NEGATIVE MG/DL
HCG,BETA SUBUNIT,QUAL,SERUM: ABNORMAL MIU/ML (ref 0–5)
HCT VFR BLD CALC: 41.9 % (ref 37–47)
HEMOGLOBIN: 12.8 GM/DL (ref 12–16)
IMMATURE GRANS (ABS): 0.02 THOU/MM3 (ref 0–0.07)
IMMATURE GRANULOCYTES: 0.2 %
KETONES, URINE: 80
LEUKOCYTE ESTERASE, URINE: NEGATIVE
LYMPHOCYTES # BLD: 13.7 %
LYMPHOCYTES ABSOLUTE: 1.2 THOU/MM3 (ref 1–4.8)
MAGNESIUM: 2.2 MG/DL (ref 1.6–2.4)
MCH RBC QN AUTO: 22.7 PG (ref 26–33)
MCHC RBC AUTO-ENTMCNC: 30.5 GM/DL (ref 32.2–35.5)
MCV RBC AUTO: 74.4 FL (ref 81–99)
MISCELLANEOUS 2: ABNORMAL
MONOCYTES # BLD: 5.7 %
MONOCYTES ABSOLUTE: 0.5 THOU/MM3 (ref 0.4–1.3)
MUCUS: ABNORMAL
NITRITE, URINE: NEGATIVE
NUCLEATED RED BLOOD CELLS: 0 /100 WBC
OSMOLALITY CALCULATION: 274.5 MOSMOL/KG (ref 275–300)
PH UA: 6 (ref 5–9)
PLATELET # BLD: 327 THOU/MM3 (ref 130–400)
PMV BLD AUTO: 10.4 FL (ref 9.4–12.4)
POTASSIUM REFLEX MAGNESIUM: 3.3 MEQ/L (ref 3.5–5.2)
PROTEIN UA: 30
RBC # BLD: 5.63 MILL/MM3 (ref 4.2–5.4)
RBC URINE: ABNORMAL /HPF
RENAL EPITHELIAL, UA: ABNORMAL
SEG NEUTROPHILS: 79.6 %
SEGMENTED NEUTROPHILS ABSOLUTE COUNT: 7.2 THOU/MM3 (ref 1.8–7.7)
SODIUM BLD-SCNC: 136 MEQ/L (ref 135–145)
SPECIFIC GRAVITY, URINE: > 1.03 (ref 1–1.03)
TOTAL PROTEIN: 8.8 G/DL (ref 6.1–8)
UROBILINOGEN, URINE: 1 EU/DL (ref 0–1)
WBC # BLD: 9.1 THOU/MM3 (ref 4.8–10.8)
WBC UA: ABNORMAL /HPF
YEAST: ABNORMAL

## 2022-09-04 PROCEDURE — 85025 COMPLETE CBC W/AUTO DIFF WBC: CPT

## 2022-09-04 PROCEDURE — 96361 HYDRATE IV INFUSION ADD-ON: CPT

## 2022-09-04 PROCEDURE — 76817 TRANSVAGINAL US OBSTETRIC: CPT

## 2022-09-04 PROCEDURE — 2580000003 HC RX 258: Performed by: STUDENT IN AN ORGANIZED HEALTH CARE EDUCATION/TRAINING PROGRAM

## 2022-09-04 PROCEDURE — 84702 CHORIONIC GONADOTROPIN TEST: CPT

## 2022-09-04 PROCEDURE — 6360000002 HC RX W HCPCS: Performed by: STUDENT IN AN ORGANIZED HEALTH CARE EDUCATION/TRAINING PROGRAM

## 2022-09-04 PROCEDURE — 96375 TX/PRO/DX INJ NEW DRUG ADDON: CPT

## 2022-09-04 PROCEDURE — 80048 BASIC METABOLIC PNL TOTAL CA: CPT

## 2022-09-04 PROCEDURE — 80076 HEPATIC FUNCTION PANEL: CPT

## 2022-09-04 PROCEDURE — 93975 VASCULAR STUDY: CPT

## 2022-09-04 PROCEDURE — 83735 ASSAY OF MAGNESIUM: CPT

## 2022-09-04 PROCEDURE — 99284 EMERGENCY DEPT VISIT MOD MDM: CPT

## 2022-09-04 PROCEDURE — 81001 URINALYSIS AUTO W/SCOPE: CPT

## 2022-09-04 PROCEDURE — 96374 THER/PROPH/DIAG INJ IV PUSH: CPT

## 2022-09-04 RX ORDER — 0.9 % SODIUM CHLORIDE 0.9 %
1000 INTRAVENOUS SOLUTION INTRAVENOUS ONCE
Status: COMPLETED | OUTPATIENT
Start: 2022-09-04 | End: 2022-09-04

## 2022-09-04 RX ORDER — ONDANSETRON 4 MG/1
4 TABLET, ORALLY DISINTEGRATING ORAL 3 TIMES DAILY PRN
Qty: 15 TABLET | Refills: 0 | Status: SHIPPED | OUTPATIENT
Start: 2022-09-04 | End: 2022-09-09

## 2022-09-04 RX ORDER — DIPHENHYDRAMINE HYDROCHLORIDE 25 MG/1
25 CAPSULE ORAL NIGHTLY
Qty: 5 TABLET | Refills: 0 | Status: SHIPPED | OUTPATIENT
Start: 2022-09-04 | End: 2022-09-09

## 2022-09-04 RX ORDER — DIPHENHYDRAMINE HYDROCHLORIDE 50 MG/ML
25 INJECTION INTRAMUSCULAR; INTRAVENOUS ONCE
Status: COMPLETED | OUTPATIENT
Start: 2022-09-04 | End: 2022-09-04

## 2022-09-04 RX ORDER — POTASSIUM CHLORIDE 20 MEQ/1
40 TABLET, EXTENDED RELEASE ORAL ONCE
Status: DISCONTINUED | OUTPATIENT
Start: 2022-09-04 | End: 2022-09-04 | Stop reason: HOSPADM

## 2022-09-04 RX ORDER — ONDANSETRON 2 MG/ML
4 INJECTION INTRAMUSCULAR; INTRAVENOUS ONCE
Status: COMPLETED | OUTPATIENT
Start: 2022-09-04 | End: 2022-09-04

## 2022-09-04 RX ADMIN — DIPHENHYDRAMINE HYDROCHLORIDE 25 MG: 50 INJECTION INTRAMUSCULAR; INTRAVENOUS at 11:24

## 2022-09-04 RX ADMIN — ONDANSETRON 4 MG: 2 INJECTION INTRAMUSCULAR; INTRAVENOUS at 12:40

## 2022-09-04 RX ADMIN — SODIUM CHLORIDE 1000 ML: 9 INJECTION, SOLUTION INTRAVENOUS at 12:40

## 2022-09-04 RX ADMIN — SODIUM CHLORIDE 1000 ML: 9 INJECTION, SOLUTION INTRAVENOUS at 11:24

## 2022-09-04 ASSESSMENT — ENCOUNTER SYMPTOMS
EYE ITCHING: 0
ABDOMINAL DISTENTION: 0
NAUSEA: 1
SHORTNESS OF BREATH: 0
COLOR CHANGE: 0
CHEST TIGHTNESS: 0
SINUS PAIN: 0
ABDOMINAL PAIN: 1
EYE DISCHARGE: 0
SINUS PRESSURE: 0
RHINORRHEA: 0
VOMITING: 1
DIARRHEA: 0
EYE REDNESS: 0

## 2022-09-04 ASSESSMENT — PAIN SCALES - GENERAL: PAINLEVEL_OUTOF10: 3

## 2022-09-04 ASSESSMENT — PAIN DESCRIPTION - LOCATION: LOCATION: ABDOMEN

## 2022-09-04 ASSESSMENT — PAIN - FUNCTIONAL ASSESSMENT: PAIN_FUNCTIONAL_ASSESSMENT: 0-10

## 2022-09-04 NOTE — ED NOTES
Patient resting in bed. Respirations easy and unlabored. No distress noted. Call light within reach.      Alba Grimaldo RN  09/04/22 8025

## 2022-09-04 NOTE — ED PROVIDER NOTES
I performed a history and physical examination of the patient and discussed management with the resident. I reviewed the residents note and agree with the documented findings and plan of care. Any areas of disagreement are noted on the chart. I was personally present for the key portions of any procedures. I have documented in the chart those procedures where I was not present during the key portions. I have reviewed the emergency nurses triage note. I agree with the chief complaint, past medical history, past surgical history, allergies, medications, social and family history as documented unless otherwise noted below. Documentation of the HPI, Physical Exam and Medical Decision Making performed by medical students or scribes is based on my personal performance of the HPI, PE and MDM. For Phys Assistant/ Nurse Practitioner cases/documentation I have personally evaluated this patient and have completed at least one if not all key elements of the E/M (history, physical exam, and MDM). My findings are as noted below     Nausea and vomiting in a pregnant patient. Patient is G2,  coming in indeterminant length of pregnancy, last known menstrual period was 2022 seen by Dr. Lana Haskins. Coming in for nausea and vomiting. Mild abdominal pain denying any vaginal bleeding or discharge      US OB TRANSVAGINAL   Final Result      1. There is a single live intrauterine pregnancy which corresponds to gestational age of 7 weeks and 4 days based off of ultrasound criteria. Fetal heart tones were obtained measuring 106 bpm.   2. There is a hypoechoic region at the right ovary of uncertain etiology. This could represent a hemorrhagic cyst or endometrioma although other etiologies can't be excluded. If clinically warranted further evaluation with nonemergent MRI would be    beneficial for further characterization. **This report has been created using voice recognition software.  It may contain minor errors which are inherent in voice recognition technology. **      Final report electronically signed by Dr Glendy Mohr on 9/4/2022 2:48 PM      US DUP ABD PEL RETRO SCROT COMPLETE   Final Result      1. There is a single live intrauterine pregnancy which corresponds to gestational age of 7 weeks and 4 days based off of ultrasound criteria. Fetal heart tones were obtained measuring 106 bpm.   2. There is a hypoechoic region at the right ovary of uncertain etiology. This could represent a hemorrhagic cyst or endometrioma although other etiologies can't be excluded. If clinically warranted further evaluation with nonemergent MRI would be    beneficial for further characterization. **This report has been created using voice recognition software. It may contain minor errors which are inherent in voice recognition technology. **      Final report electronically signed by Dr Glendy Mohr on 9/4/2022 2:48 PM        Labs Reviewed   CBC WITH AUTO DIFFERENTIAL - Abnormal; Notable for the following components:       Result Value    RBC 5.63 (*)     MCV 74.4 (*)     MCH 22.7 (*)     MCHC 30.5 (*)     RDW-CV 19.1 (*)     RDW-SD 47.9 (*)     All other components within normal limits   BASIC METABOLIC PANEL W/ REFLEX TO MG FOR LOW K - Abnormal; Notable for the following components:    Potassium reflex Magnesium 3.3 (*)     Chloride 97 (*)     Glucose 110 (*)     Calcium 10.7 (*)     All other components within normal limits   HEPATIC FUNCTION PANEL - Abnormal; Notable for the following components:    Albumin 5.6 (*)     Total Protein 8.8 (*)     All other components within normal limits   HCG, QUANTITATIVE, PREGNANCY - Abnormal; Notable for the following components:    hCG,Beta Subunit,Qual,Serum 82371.0 (*)     All other components within normal limits   OSMOLALITY - Abnormal; Notable for the following components:    Osmolality Calc 274.5 (*)     All other components within normal limits   URINE WITH REFLEXED MICRO - Abnormal; Notable for the following components:    Ketones, Urine 80 (*)     Specific Gravity, Urine > 1.030 (*)     Protein, UA 30 (*)     Character, Urine CLOUDY (*)     All other components within normal limits   ANION GAP   GLOMERULAR FILTRATION RATE, ESTIMATED   MAGNESIUM         Final diagnoses:   Nausea and vomiting of pregnancy, antepartum   Hypokalemia   . I have seen this patient with Dr. Maria Luz Ortiz and agree with his assessment and plan.      Nick Moritz, DO  09/04/22 2015

## 2022-09-04 NOTE — ED PROVIDER NOTES
[facility] ENCOUNTER          Pt Name: Maria De Jesus Ruvalcaba  MRN: [id number]  [place] 2000  Date of evaluation: 9/4/2022  Treating Resident Physician: Alex Nicholas DO  Supervising Physician: Dr. Luis Harmon    History obtained from the patient. CHIEF COMPLAINT       Chief Complaint   Patient presents with    Emesis During Pregnancy           HISTORY OF PRESENT ILLNESS    HPI  Maria De Jesus Ruvalcaba is a 20 y.o. female who presents to the emergency department for evaluation of nausea and vomiting during pregnancy. The patient states that she found out that she was pregnant 1 week ago. She reports that she has had intractable nausea and vomiting for the past 5 days and states that she cannot keep anything down including water. She states that she is a G4A2L1 with pregnancies 1 and 3 being miscarriages and pregnancy 2 being a vaginal delivery induced at full-term with no complications. She reports that she had some nausea and vomiting with pregnancy #2 but not as bad as today. She reports that her OB is Dr. Sean Anaya. She reports that she is not sure if her FDLMP was 7/20/2022 - 7/21/2022 or if she had implantation bleeding at that time. She reports that the amount of bleeding that she had was slightly short her then a usual cycle. She reports associated hot flashes, upper midline abdominal pain while vomiting due to clenching her stomach muscles, nausea, feeling like she might have the flu, and decreased urinary frequency. She reports that she does not drink alcohol since finding out that she was pregnant and that she last used marijuana over 1 month ago. She was seen at urgent care 2 days ago for the symptoms and began taking Pepcid AC which she reports has been helping a little. The patient has no other acute complaints at this time. REVIEW OF SYSTEMS   Review of Systems   Constitutional:  Negative for fever.    HENT:  Negative for rhinorrhea, sinus pressure and sinus pain. Eyes:  Negative for discharge, redness and itching. Respiratory:  Negative for chest tightness and shortness of breath. Cardiovascular:  Negative for chest pain. Gastrointestinal:  Positive for abdominal pain, nausea and vomiting. Negative for abdominal distention and diarrhea. Genitourinary:  Negative for difficulty urinating, dysuria, frequency, hematuria, urgency, vaginal bleeding and vaginal discharge. Musculoskeletal:  Negative for arthralgias. Skin:  Negative for color change and pallor. Neurological:  Negative for dizziness, light-headedness and headaches. PAST MEDICAL AND SURGICAL HISTORY     Past Medical History:   Diagnosis Date    Anemia     Says maybe anemic but not sure. Depression     Environmental and seasonal allergies     Heart abnormality     heart murmur     Heart murmur     Heart murmur      Past Surgical History:   Procedure Laterality Date    BREAST BIOPSY Left 2019    Biopsy was ok.     WISDOM TOOTH EXTRACTION           MEDICATIONS     Current Facility-Administered Medications:     potassium chloride (KLOR-CON M) extended release tablet 40 mEq, 40 mEq, Oral, Once, Maria T [place], DO    Current Outpatient Medications:     ondansetron (ZOFRAN-ODT) 4 MG disintegrating tablet, Take 1 tablet by mouth 3 times daily as needed for Nausea or Vomiting, Disp: 15 tablet, Rfl: 0    doxyLAMINE succinate (GNP SLEEP AID) 25 MG tablet, Take 1 tablet by mouth nightly for 5 days, Disp: 5 tablet, Rfl: 0    vitamin B-6 (PYRIDOXINE) 25 MG tablet, Take 1 tablet by mouth at bedtime for 5 days, Disp: 5 tablet, Rfl: 0    Prenatal MV & Min w/FA-DHA (PRENATAL ADULT GUMMY/DHA/FA) 0.4-25 MG CHEW, Take by mouth, Disp: , Rfl:     FLUoxetine (PROZAC) 40 MG capsule, Take 1 capsule by mouth daily, Disp: 90 capsule, Rfl: 3      SOCIAL HISTORY     Social History     Social History Narrative    Not on file     Social History     Tobacco Use    Smoking status: Never Smokeless tobacco: Never   Vaping Use    Vaping Use: Former    Substances: Nicotine    Devices: Disposable   Substance Use Topics    Alcohol use: No     Alcohol/week: 0.0 standard drinks    Drug use: No     Comment: Says never but prenatal say history of THC         ALLERGIES   No Known Allergies      FAMILY HISTORY     Family History   Problem Relation Age of Onset    Ovarian Cancer Other     Breast Cancer Maternal Great Grandmother         under age 48    Heart Attack Maternal Grandfather          PREVIOUS RECORDS   Previous records reviewed        PHYSICAL EXAM     ED Triage Vitals [09/04/22 1104]   BP Temp Temp Source Heart Rate Resp SpO2 Height Weight   138/84 98.2 °F (36.8 °C) Oral 76 17 99 % 5' 7\" (1.702 m) 205 lb (93 kg)     Initial vital signs and nursing assessment reviewed and normal. Body mass index is 32.11 kg/m². Pulsoximetry is normal per my interpretation. Additional Vital Signs:  Vitals:    09/04/22 1450   BP:    Pulse: 78   Resp: 16   Temp:    SpO2: 100%       Physical Exam  Vitals and nursing note reviewed. Constitutional:       General: She is not in acute distress. Appearance: Normal appearance. She is ill-appearing. HENT:      Head: Normocephalic and atraumatic. Nose: Nose normal. No congestion or rhinorrhea. Mouth/Throat:      Mouth: Mucous membranes are moist.      Pharynx: Oropharynx is clear. No oropharyngeal exudate or posterior oropharyngeal erythema. Eyes:      Extraocular Movements: Extraocular movements intact. Pupils: Pupils are equal, round, and reactive to light. Cardiovascular:      Rate and Rhythm: Normal rate and regular rhythm. Pulses: Normal pulses. Heart sounds: Murmur heard. Comments: Systolic murmur present. The patient reports that she has a history of a murmur. Pulmonary:      Effort: Pulmonary effort is normal.      Breath sounds: Normal breath sounds. Abdominal:      General: Abdomen is flat. There is no distension. Palpations: Abdomen is soft. Tenderness: There is abdominal tenderness. Comments: There is mostly generalized abdominal tenderness to palpation. The patient denies abdominal tenderness to palpation in the hypogastric region. Musculoskeletal:         General: No swelling or tenderness. Normal range of motion. Cervical back: Normal range of motion. Right lower leg: No edema. Left lower leg: No edema. Skin:     General: Skin is warm and dry. Neurological:      General: No focal deficit present. Mental Status: She is alert and oriented to person, place, and time. Mental status is at baseline. MEDICAL DECISION MAKING   Initial Assessment:   54-year-old female presenting the emergency department for evaluation of nausea and vomiting with pregnancy. Differential diagnosis includes was not limited to: Anemia, electrolyte abnormality, SASKIA, UTI, gastritis, gastroenteritis, ectopic pregnancy. Plan:   IV, labs. Beta-hCG quant. UA with reflex to culture. US OB transvaginal with Dopplers. 1 L IV fluid bolus. 25 mg IV diphenhydramine.         ED RESULTS   Laboratory results:  Labs Reviewed   CBC WITH AUTO DIFFERENTIAL - Abnormal; Notable for the following components:       Result Value    RBC 5.63 (*)     MCV 74.4 (*)     MCH 22.7 (*)     MCHC 30.5 (*)     RDW-CV 19.1 (*)     RDW-SD 47.9 (*)     All other components within normal limits   BASIC METABOLIC PANEL W/ REFLEX TO MG FOR LOW K - Abnormal; Notable for the following components:    Potassium reflex Magnesium 3.3 (*)     Chloride 97 (*)     Glucose 110 (*)     Calcium 10.7 (*)     All other components within normal limits   HEPATIC FUNCTION PANEL - Abnormal; Notable for the following components:    Albumin 5.6 (*)     Total Protein 8.8 (*)     All other components within normal limits   HCG, QUANTITATIVE, PREGNANCY - Abnormal; Notable for the following components:    hCG,Beta Subunit,Qual,Serum 95782.0 (*)     All other inherent in voice recognition technology. **      Final report electronically signed by Dr Lalita Stuart on 9/4/2022 2:48 PM          ED Medications administered this visit:   Medications   potassium chloride (KLOR-CON M) extended release tablet 40 mEq (has no administration in time range)   0.9 % sodium chloride bolus (0 mLs IntraVENous Stopped 9/4/22 1237)   diphenhydrAMINE (BENADRYL) injection 25 mg (25 mg IntraVENous Given 9/4/22 1124)   ondansetron (ZOFRAN) injection 4 mg (4 mg IntraVENous Given 9/4/22 1240)   0.9 % sodium chloride bolus (0 mLs IntraVENous Stopped 9/4/22 1355)         ED COURSE     ED Course as of 09/04/22 1542   Sun Sep 04, 2022   1214 The patient reported that her nausea is improved but not resolved after 25 mg of IV diphenhydramine and almost 1 L of fluids and felt like trying another antiemetic. Will start 4 mg Zofran IV. [DO]   3867 Basic Metabolic Panel w/ Reflex to MG(!):    Sodium 136   Potassium 3.3(!)   Chloride 97(!)   CO2 23   Glucose, Random 110(!)   BUN,BUNPL 18   Creatinine 0.7   CALCIUM, SERUM, 031244 10.7(!) [DO]   1216 CBC with Auto Differential(!):    WBC 9.1   RBC 5.63(!)   Hemoglobin Quant 12.8   Hematocrit 41.9   MCV 74.4(!)   MCH 22.7(!)   MCHC 30.5(!)   RDW-CV 19.1(!)   RDW-SD 47. 9(!)   Platelet Count 966   MPV 10.4   Seg Neutrophils 79.6   Lymphocytes 13.7   Monocytes 5.7   Eosinophils 0.0   Basophils 0.8   Immature Granulocytes 0.2   Segs Absolute 7.2   Lymphocytes Absolute 1.2   Monocytes Absolute 0.5   Eosinophils Absolute 0.0   Basophils Absolute 0.1   Immature Grans (Abs) 0.02   Nucleated Red Blood Cells 0 [DO]   1216 CBC and BMP suggesting slight hemoconcentration and dehydration evidenced by hypokalemia, hypochloremia, and hypercalcemia. She reports that she has been able to void a little here. We will plan to start an additional 1 L IV fluid bolus.  [DO]   9717 hCG,Beta Subunit,Qual,Serum(!): 77232.0 [DO]   4879 [place] DUP ABD PEL RETRO SCROT COMPLETE [DO] [address]:     1. There is a single live intrauterine pregnancy which corresponds to gestational age of 7 weeks and 4 days based off of ultrasound criteria. Fetal heart tones were obtained measuring 106 bpm.  2. There is a hypoechoic region at the right ovary of uncertain etiology. This could represent a hemorrhagic cyst or endometrioma although other etiologies can't be excluded. If clinically warranted further evaluation with nonemergent MRI would be   beneficial for further characterization. [DO]   3484 Patient reassessed and reported that she feels much better after Zofran, Benadryl, and IV fluids. She is tolerating drinking water and eating crackers. The patient reports that she would like to try taking Zofran at home. We will plan to discharge the patient home with a prescription for doxylamine and B6 and Zofran as needed for nausea with PCP and OB follow-up. [DO]      ED Course User Index  [DO] Galo DO Marissa     Potassium repleted with 40 mEq potassium chloride p.o. Strict return precautions and follow up instructions were discussed with the patient prior to discharge, with which the patient agrees. The patient is instructed to:    Do not hesitate to return to the emergency department if you are experiencing any new or worsening symptoms such as lightheadedness, dizziness, feel as if you are going to pass out, chest pain, shortness of breath, abdominal pain, nausea and vomiting that does not go away, difficulty urinating, burning with urination, blood in your urine, vaginal bleeding or abnormal vaginal discharge, or if you have any other concerns. Begin taking doxylamine and B6 as prescribed. If your nausea and vomiting persists after taking doxylamine and B6, take Zofran as prescribed. Follow-up with your primary care provider and with Dr. Lilia Calvert regarding your emergency department visit today and to review the results of your ultrasound.     MEDICATION CHANGES     New Prescriptions    DOXYLAMINE SUCCINATE (GNP SLEEP AID) 25 MG TABLET    Take 1 tablet by mouth nightly for 5 days    ONDANSETRON (ZOFRAN-ODT) 4 MG DISINTEGRATING TABLET    Take 1 tablet by mouth 3 times daily as needed for Nausea or Vomiting    VITAMIN B-6 (PYRIDOXINE) 25 MG TABLET    Take 1 tablet by mouth at bedtime for 5 days         FINAL DISPOSITION     Final diagnoses:   Nausea and vomiting of pregnancy, antepartum   Hypokalemia     Condition: condition: good  Dispo: Discharge to home      This transcription was electronically signed. Parts of this transcriptions may have been dictated by use of voice recognition software and electronically transcribed, and parts may have been transcribed with the assistance of an ED scribe. The transcription may contain errors not detected in proofreading. Please refer to my supervising physician's documentation if my documentation differs.     Electronically Signed: Anita Dhaliwal DO, 09/04/22, 3:42 PM          Anita Dhaliwal DO  Resident  09/04/22 0565

## 2022-09-04 NOTE — ED NOTES
Patient resting in bed. Respirations easy and unlabored. No distress noted. Call light within reach. Pt provided crackers at this time, tolerated well.        Alba Grimaldo RN  09/04/22 0053

## 2022-09-04 NOTE — DISCHARGE INSTRUCTIONS
Do not hesitate to return to the emergency department if you are experiencing any new or worsening symptoms such as lightheadedness, dizziness, feel as if you are going to pass out, chest pain, shortness of breath, abdominal pain, nausea and vomiting that does not go away, difficulty urinating, burning with urination, blood in your urine, vaginal bleeding or abnormal vaginal discharge, or if you have any other concerns. Begin taking doxylamine and B6 as prescribed. If your nausea and vomiting persists after taking doxylamine and B6, take Zofran as prescribed. Follow-up with your primary care provider and with Dr. Nathanael Meigs regarding your emergency department visit today and to review the results of your ultrasound.

## 2022-10-19 ENCOUNTER — NURSE ONLY (OUTPATIENT)
Dept: LAB | Age: 22
End: 2022-10-19